# Patient Record
Sex: MALE | Race: OTHER | HISPANIC OR LATINO | Employment: FULL TIME | URBAN - METROPOLITAN AREA
[De-identification: names, ages, dates, MRNs, and addresses within clinical notes are randomized per-mention and may not be internally consistent; named-entity substitution may affect disease eponyms.]

---

## 2019-02-25 ENCOUNTER — HOSPITAL ENCOUNTER (EMERGENCY)
Facility: HOSPITAL | Age: 29
Discharge: HOME | End: 2019-02-25
Attending: EMERGENCY MEDICINE | Admitting: EMERGENCY MEDICINE

## 2019-02-25 VITALS
TEMPERATURE: 98.1 F | HEART RATE: 88 BPM | SYSTOLIC BLOOD PRESSURE: 145 MMHG | DIASTOLIC BLOOD PRESSURE: 90 MMHG | RESPIRATION RATE: 18 BRPM | OXYGEN SATURATION: 96 %

## 2019-02-25 DIAGNOSIS — N48.1 BALANITIS: Primary | ICD-10-CM

## 2019-02-25 DIAGNOSIS — N47.7 POSTHITIS: ICD-10-CM

## 2019-02-25 LAB
BACTERIA UR QL AUTO: ABNORMAL /HPF
BILIRUB UR QL STRIP: NEGATIVE
C TRACH DNA SPEC QL NAA+PROBE: NEGATIVE
CLARITY UR: CLEAR
COLOR UR: YELLOW
GLUCOSE SERPL-MCNC: 93 MG/DL (ref 65–140)
GLUCOSE UR STRIP-MCNC: NEGATIVE MG/DL
HGB UR QL STRIP.AUTO: ABNORMAL
KETONES UR STRIP-MCNC: NEGATIVE MG/DL
LEUKOCYTE ESTERASE UR QL STRIP: ABNORMAL
N GONORRHOEA DNA SPEC QL NAA+PROBE: NEGATIVE
NITRITE UR QL STRIP: NEGATIVE
NON-SQ EPI CELLS URNS QL MICRO: ABNORMAL /HPF
PH UR STRIP.AUTO: 7 [PH] (ref 4.5–8)
PROT UR STRIP-MCNC: ABNORMAL MG/DL
RBC #/AREA URNS AUTO: ABNORMAL /HPF
SP GR UR STRIP.AUTO: >=1.03 (ref 1–1.03)
UROBILINOGEN UR QL STRIP.AUTO: 0.2 E.U./DL
WBC #/AREA URNS AUTO: ABNORMAL /HPF

## 2019-02-25 PROCEDURE — 81003 URINALYSIS AUTO W/O SCOPE: CPT

## 2019-02-25 PROCEDURE — 81001 URINALYSIS AUTO W/SCOPE: CPT

## 2019-02-25 PROCEDURE — 82948 REAGENT STRIP/BLOOD GLUCOSE: CPT

## 2019-02-25 PROCEDURE — 87591 N.GONORRHOEAE DNA AMP PROB: CPT | Performed by: EMERGENCY MEDICINE

## 2019-02-25 PROCEDURE — 99283 EMERGENCY DEPT VISIT LOW MDM: CPT

## 2019-02-25 PROCEDURE — 87491 CHLMYD TRACH DNA AMP PROBE: CPT | Performed by: EMERGENCY MEDICINE

## 2019-02-25 NOTE — ED PROVIDER NOTES
History  Chief Complaint   Patient presents with    Penis / Scrotum Problem     Patient reports penis rash that started in November  Patient states that his penis is now itchy and he has noticed dead skin around it  He also feels pressure on is foreskin some mornings when he wakes up and attempts to urinate  History provided by:  Patient   used: No    Penis / Scrotum Problem   Presenting symptoms: dysuria and penile discharge    Relieved by:  None tried  Worsened by:  Nothing  Ineffective treatments:  None tried  Associated symptoms: genital itching    Associated symptoms: no abdominal pain, no fever, no flank pain, no groin pain, no hematuria, no nausea, no penile redness, no penile swelling, no scrotal swelling, no urinary frequency, no urinary incontinence and no vomiting    Risk factors: unprotected sex    Risk factors: no change in medication, no new sexual partner, no recent infection and no STI exposure        None       History reviewed  No pertinent past medical history  Past Surgical History:   Procedure Laterality Date    PENIS SURGERY         History reviewed  No pertinent family history  I have reviewed and agree with the history as documented  Social History     Tobacco Use    Smoking status: Never Smoker    Smokeless tobacco: Never Used   Substance Use Topics    Alcohol use: Yes     Comment: occasionally    Drug use: Never        Review of Systems   Constitutional: Negative for fever  Gastrointestinal: Negative for abdominal pain, nausea and vomiting  Genitourinary: Positive for discharge and dysuria  Negative for bladder incontinence, flank pain, frequency, hematuria, penile swelling, scrotal swelling and testicular pain  Musculoskeletal: Negative for back pain  Skin: Positive for rash  Allergic/Immunologic: Negative for immunocompromised state  All other systems reviewed and are negative        Physical Exam  Physical Exam   Constitutional: He is oriented to person, place, and time  He appears well-developed and well-nourished  No distress  HENT:   Head: Normocephalic and atraumatic  Eyes: Right conjunctiva is not injected  Left conjunctiva is not injected  Neck: Normal range of motion  Cardiovascular: Normal rate and intact distal pulses  Pulmonary/Chest: Effort normal  No respiratory distress  Abdominal: Soft  He exhibits no distension  There is no tenderness  There is no guarding  Genitourinary: Testes normal  Right testis shows no swelling and no tenderness  Left testis shows no swelling and no tenderness  Uncircumcised  No phimosis or penile tenderness  Discharge found  Lymphadenopathy: No inguinal adenopathy noted on the right or left side  Right: No inguinal adenopathy present  Left: No inguinal adenopathy present  Neurological: He is alert and oriented to person, place, and time  He exhibits normal muscle tone  Skin: Skin is warm and dry  Capillary refill takes less than 2 seconds  No rash noted  He is not diaphoretic  No erythema  No pallor  Psychiatric: He has a normal mood and affect  Nursing note and vitals reviewed        Vital Signs  ED Triage Vitals [02/25/19 0152]   Temperature Pulse Respirations Blood Pressure SpO2   98 1 °F (36 7 °C) 88 18 145/90 96 %      Temp Source Heart Rate Source Patient Position - Orthostatic VS BP Location FiO2 (%)   Oral Monitor Sitting Right arm --      Pain Score       No Pain           Vitals:    02/25/19 0152   BP: 145/90   Pulse: 88   Patient Position - Orthostatic VS: Sitting       Visual Acuity      ED Medications  Medications - No data to display    Diagnostic Studies  Results Reviewed     Procedure Component Value Units Date/Time    Urine Microscopic [[id number]]  (Abnormal) Collected:  02/25/19 0204    Lab Status:  Final result Specimen:  Urine, Clean Catch Updated:  02/25/19 0218     RBC, UA 20-30 /hpf      WBC, UA 4-10 /hpf      Epithelial Cells Occasional /hpf Bacteria, UA Occasional /hpf     Fingerstick Glucose (POCT) [[id number]]  (Normal) Collected:  02/25/19 0215    Lab Status:  Final result Updated:  02/25/19 0216     POC Glucose 93 mg/dl     Chlamydia/GC amplified DNA by PCR [[id number]] Collected:  02/25/19 0204    Lab Status: In process Specimen:  Urine, Other Updated:  02/25/19 0208    POCT urinalysis dipstick [[id number]]  (Abnormal) Resulted:  02/25/19 0206    Lab Status:  Final result Specimen:  Urine Updated:  02/25/19 0206    ED Urine Macroscopic [[id number]]  (Abnormal) Collected:  02/25/19 0204    Lab Status:  Final result Specimen:  Urine Updated:  02/25/19 0203     Color, UA Yellow     Clarity, UA Clear     pH, UA 7 0     Leukocytes, UA Trace     Nitrite, UA Negative     Protein, UA 30 (1+) mg/dl      Glucose, UA Negative mg/dl      Ketones, UA Negative mg/dl      Urobilinogen, UA 0 2 E U /dl      Bilirubin, UA Negative     Blood, UA Large     Specific Gravity, UA >=1 030    Narrative:       CLINITEK RESULT                 No orders to display              Procedures  Procedures       Phone Contacts  ED Phone Contact    ED Course                               MDM  Number of Diagnoses or Management Options  Balanitis: new and requires workup  Posthitis: new and requires workup  Diagnosis management comments: Patient presents for 4 months of penile discharge, itching and now dysuria for the past 3 days  Patient states that this started after he was stuck in [place] in November  He is a  and was stuck on the side of the road  He states that he had to urinate into a juice bottle for several days and was unable to clean his foreskin  Patient states that the itching and discharge have become worse since that time  Patient does not have a family physician to follow up with  Patient has not tried treatment at home  He has had unprotected sex with 1 partner which is his wife  His wife has been tested for STDs and has been negative  Patient now reporting dysuria but no discharge from the urethra  On exam the patient does appear to have a candidal infection from improper foreskin cleaning  There is excoriation markings with bleeding when touched  Does not appear to be STD related  Fingerstick blood sugar is normal   Urinalysis does show red blood cells and white blood cells but only occasional bacteria  Patient is complaining of dysuria because the urine is hitting the area and burning  I do not feel that this is a true urinary tract infection  Will treat with topical antifungals and have him follow up with the [facility]  Urine will be sent for culture and chlamydia and gonorrhea  If those are positive he will receive a call for further treatment  Patient understands this and agrees with this plan of care  Amount and/or Complexity of Data Reviewed  Clinical lab tests: ordered and reviewed  Tests in the medicine section of CPT®: reviewed and ordered    Patient Progress  Patient progress: stable      Disposition  Final diagnoses:   Balanitis   Posthitis     Time reflects when diagnosis was documented in both MDM as applicable and the Disposition within this note     Time User Action Codes Description Comment    2/25/2019  2:19 AM Dev Alegre Add [N48 1] Balanitis     2/25/2019  2:19 AM Corine Rivera Add [N47 7] Posthitis       ED Disposition     ED Disposition Condition Date/Time Comment    Discharge Stable Mon Feb 25, 2019  2:19 AM Bruno Ice Avitia discharge to home/self care              Follow-up Information     Follow up With Specialties Details Why [address] [facility] [address] Family Medicine Call today To schedule an appointment as soon as you can 7 [address] [address] [phone]            Patient's Medications   Discharge Prescriptions    MICONAZOLE 2 % CREAM    Apply topically 2 (two) times a day for 14 days       Start Date: 2/25/2019 End Date: 3/11/2019       Order Dose: --       Quantity: 35 g    Refills: 0     No discharge procedures on file      ED Provider  Electronically Signed by           Briana Puckett DO  02/25/19 8378

## 2019-12-31 ENCOUNTER — HOSPITAL ENCOUNTER (EMERGENCY)
Facility: HOSPITAL | Age: 29
Discharge: HOME | End: 2019-12-31
Attending: EMERGENCY MEDICINE | Admitting: EMERGENCY MEDICINE

## 2019-12-31 VITALS
RESPIRATION RATE: 18 BRPM | SYSTOLIC BLOOD PRESSURE: 140 MMHG | WEIGHT: 221.78 LBS | DIASTOLIC BLOOD PRESSURE: 74 MMHG | HEART RATE: 67 BPM | OXYGEN SATURATION: 98 % | TEMPERATURE: 98.3 F

## 2019-12-31 DIAGNOSIS — N39.0 UTI (URINARY TRACT INFECTION): Primary | ICD-10-CM

## 2019-12-31 LAB
BACTERIA UR QL AUTO: ABNORMAL /HPF
BILIRUB UR QL STRIP: NEGATIVE
CLARITY UR: CLEAR
COLOR UR: YELLOW
GLUCOSE UR STRIP-MCNC: NEGATIVE MG/DL
HGB UR QL STRIP.AUTO: ABNORMAL
KETONES UR STRIP-MCNC: NEGATIVE MG/DL
LEUKOCYTE ESTERASE UR QL STRIP: ABNORMAL
NITRITE UR QL STRIP: NEGATIVE
NON-SQ EPI CELLS URNS QL MICRO: ABNORMAL /HPF
OTHER STN SPEC: ABNORMAL
PH UR STRIP.AUTO: 6 [PH] (ref 4.5–8)
PROT UR STRIP-MCNC: ABNORMAL MG/DL
RBC #/AREA URNS AUTO: ABNORMAL /HPF
SP GR UR STRIP.AUTO: >=1.03 (ref 1–1.03)
UROBILINOGEN UR QL STRIP.AUTO: 1 E.U./DL
WBC #/AREA URNS AUTO: ABNORMAL /HPF

## 2019-12-31 PROCEDURE — 81001 URINALYSIS AUTO W/SCOPE: CPT

## 2019-12-31 PROCEDURE — 87086 URINE CULTURE/COLONY COUNT: CPT

## 2019-12-31 PROCEDURE — 99283 EMERGENCY DEPT VISIT LOW MDM: CPT

## 2019-12-31 PROCEDURE — 87591 N.GONORRHOEAE DNA AMP PROB: CPT | Performed by: EMERGENCY MEDICINE

## 2019-12-31 PROCEDURE — 99283 EMERGENCY DEPT VISIT LOW MDM: CPT | Performed by: EMERGENCY MEDICINE

## 2019-12-31 PROCEDURE — 87491 CHLMYD TRACH DNA AMP PROBE: CPT | Performed by: EMERGENCY MEDICINE

## 2019-12-31 RX ORDER — CEPHALEXIN 250 MG/1
500 CAPSULE ORAL ONCE
Status: COMPLETED | OUTPATIENT
Start: 2019-12-31 | End: 2019-12-31

## 2019-12-31 RX ORDER — CEPHALEXIN 500 MG/1
500 CAPSULE ORAL EVERY 6 HOURS SCHEDULED
Qty: 28 CAPSULE | Refills: 0 | Status: SHIPPED | OUTPATIENT
Start: 2019-12-31 | End: 2020-01-07

## 2019-12-31 RX ADMIN — CEPHALEXIN 500 MG: 250 CAPSULE ORAL at 19:37

## 2019-12-31 NOTE — ED ATTENDING ATTESTATION
12/31/2019  Jax GUTIERREZ, saw and evaluated the patient  I have discussed the patient with the resident/non-physician practitioner and agree with the resident's/non-physician practitioner's findings, Plan of Care, and MDM as documented in the resident's/non-physician practitioner's note, except where noted  All available labs and Radiology studies were reviewed  I was present for key portions of any procedure(s) performed by the resident/non-physician practitioner and I was immediately available to provide assistance  At this point I agree with the current assessment done in the Emergency Department  I have conducted an independent evaluation of this patient a history and physical is as follows:    A 33 yo male with no significant pmhx; presents with dysuria, penile discharge and foreskin swelling  Pt states symptoms have been present for the past several months  Patient describes the discharge as clear to white  Patient reports being seen in the ED for the symptoms at onset, being prescribed a fungal cream which he has been using intermittently without relief  Patient has also noticed a color change to his foreskin, and a tightening to his foreskin  Patient states he is able to retract the foreskin without difficulty  Patient otherwise denies fever, chills, chest pain, shortness of breath, abdominal pain, nausea, vomiting, diarrhea, peripheral edema and rashes  Physical Exam  General Appearance: alert and oriented, nad, non toxic appearing  Skin:  Warm, dry, intact  HEENT: atraumatic, normocephalic  Neck: Supple, trachea midline  Cardiac: RRR; no murmurs, rub, gallops  Pulmonary: lungs CTAB; no wheezes, rales, rhonchi  Gastrointestinal: abdomen soft, nontender, nondistended; no guarding or rebound tenderness; good bowel sounds, no mass or bruits  Genitourinary: Exam completed with resident physician, Dr Brand Said  Foreskin easily retracted, no swelling noted    No discharge noted under the foreskin  No penile discharge  Extremities:  no pedal edema, 2+ pulses; no calf tenderness, no clubbing, no cyanosis  Neuro:  no focal motor or sensory deficits, CN 2-12 grossly intact  Psych:  Normal mood and affect, normal judgement and insight    Assessment and Plan:  1 ) Balanitis, acute on chronic in nature  Will plan to start nystatin cream   Recommend urology follow up  2 ) Dysuria, associated with penile discharge  Pt denies possibility for GC/Chlamydia    Will obtain UA to evaluate for UTI, will also screen for GC/Chlamydia    ED Course         Critical Care Time  Procedures

## 2020-01-01 LAB
BACTERIA UR CULT: NORMAL
C TRACH DNA SPEC QL NAA+PROBE: NEGATIVE
N GONORRHOEA DNA SPEC QL NAA+PROBE: NEGATIVE

## 2020-01-02 NOTE — ED PROVIDER NOTES
History  Chief Complaint   Patient presents with    Penis Pain     Pt  reports pain with urination and white penile discharge  Pt  reports his foreskin is extremely tight  Pt  reports pain with erection  Patient is a 71-year-old male the history of ureteral stenosis status post dilation that presents with penile discharge  Patient says that for the past 12 months he has been having intermittent whitish discharge from his penis  Patient also reports burning dysuria tip of his penis  He also endorses some color change around his foreskin  He also reports that sometimes it is difficult to retract his foreskin was he has an erection he feels like his foreskin is tighter than normal   Patient was seen here about 9 months ago for similar complaints and was diagnosed with balanitis  He was given AT foam will cream which she has been using  He reports no improvement of symptoms  He says that he has not been sexually active for the past 1 month his only been sexually active with his wife over the past several years  No known history of gonorrhea or Chlamydia  He denies any testicular pain, swelling, erythema  He denies any abdominal pain, nausea, vomiting, fevers, chills, rigors  Denies any chest pain or dyspnea  None       History reviewed  No pertinent past medical history  Past Surgical History:   Procedure Laterality Date    PENIS SURGERY         History reviewed  No pertinent family history  I have reviewed and agree with the history as documented  Social History     Tobacco Use    Smoking status: Never Smoker    Smokeless tobacco: Never Used   Substance Use Topics    Alcohol use: Yes     Comment: occasionally    Drug use: Never        Review of Systems   Constitutional: Negative for chills, diaphoresis, fatigue and fever  HENT: Negative for drooling, facial swelling, sore throat and trouble swallowing  Eyes: Negative for photophobia     Respiratory: Negative for cough, choking, chest tightness, shortness of breath, wheezing and stridor  Cardiovascular: Negative for chest pain, palpitations and leg swelling  Gastrointestinal: Negative for abdominal distention, abdominal pain, diarrhea, nausea and vomiting  Genitourinary: Positive for discharge and dysuria  Musculoskeletal: Negative for back pain, neck pain and neck stiffness  Skin: Negative for color change, pallor and rash  Neurological: Negative for dizziness, speech difficulty, weakness, light-headedness, numbness and headaches  Hematological: Negative for adenopathy  Psychiatric/Behavioral: Negative for agitation  All other systems reviewed and are negative  Physical Exam  ED Triage Vitals [12/31/19 1802]   Temperature Pulse Respirations Blood Pressure SpO2   98 3 °F (36 8 °C) 67 18 140/74 98 %      Temp Source Heart Rate Source Patient Position - Orthostatic VS BP Location FiO2 (%)   Oral Monitor Sitting Right arm --      Pain Score       6             Orthostatic Vital Signs  Vitals:    12/31/19 1802   BP: 140/74   Pulse: 67   Patient Position - Orthostatic VS: Sitting       Physical Exam   Constitutional: He is oriented to person, place, and time  He appears well-developed and well-nourished  No distress  HENT:   Head: Normocephalic  Eyes: Pupils are equal, round, and reactive to light  EOM are normal    Neck: Normal range of motion  Neck supple  Cardiovascular: Normal rate, regular rhythm, normal heart sounds and intact distal pulses  Exam reveals no gallop and no friction rub  No murmur heard  Pulmonary/Chest: Effort normal and breath sounds normal    Abdominal: Soft  Bowel sounds are normal  He exhibits no distension  There is no tenderness  There is no guarding  Genitourinary:   Genitourinary Comments: Patient with areas of hypopigmented tissue  Testicles with normal lie, nontender, non erythematous   Musculoskeletal: Normal range of motion     Neurological: He is alert and oriented to person, place, and time  No cranial nerve deficit or sensory deficit  He exhibits normal muscle tone  Skin: Capillary refill takes less than 2 seconds  Psychiatric: He has a normal mood and affect  His behavior is normal  Judgment and thought content normal    Vitals reviewed  ED Medications  Medications   cephalexin (KEFLEX) capsule 500 mg (500 mg Oral Given 12/31/19 1937)       Diagnostic Studies  Results Reviewed     Procedure Component Value Units Date/Time    Chlamydia/GC amplified DNA by PCR [[id number]]  (Normal) Collected:  12/31/19 1852    Lab Status:  Final result Specimen:  Urine, Other Updated:  01/01/20 2322     N gonorrhoeae, DNA Probe Negative     Chlamydia trachomatis, DNA Probe Negative    Narrative:       Test performed using PCR amplification of target DNA  This test is intended as an aid in the diagnosis of Chlamydial and gonococcal disease  This test has not been evaluated in patients younger than 15years of age and is not recommended for evaluation of suspected sexual abuse  Additional testing is recommended when the results do not correlate with clinical signs and symptoms        Urine culture [[id number]] Collected:  12/31/19 1850    Lab Status:  Final result Specimen:  Urine, Clean Catch Updated:  01/01/20 2002     Urine Culture No Growth <1000 cfu/mL    Urine Microscopic [[id number]]  (Abnormal) Collected:  12/31/19 1850    Lab Status:  Final result Specimen:  Urine, Clean Catch Updated:  12/31/19 1926     RBC, UA 0-1 /hpf      WBC, UA 20-30 /hpf      Epithelial Cells Occasional /hpf      Bacteria, UA Occasional /hpf      OTHER OBSERVATIONS WBCs Clumped    POCT urinalysis dipstick [[id number]]  (Abnormal) Resulted:  12/31/19 1856    Lab Status:  Final result Updated:  12/31/19 1856    Urine Macroscopic, POC [[id number]]  (Abnormal) Collected:  12/31/19 1850    Lab Status:  Final result Specimen:  Urine Updated:  12/31/19 1851     Color, UA Yellow     Clarity, UA Clear     pH, UA 6 0 Leukocytes, UA Small     Nitrite, UA Negative     Protein, UA 30 (1+) mg/dl      Glucose, UA Negative mg/dl      Ketones, UA Negative mg/dl      Urobilinogen, UA 1 0 E U /dl      Bilirubin, UA Negative     Blood, UA Trace     Specific Gravity, UA >=1 030    Narrative:       CLINITEK RESULT                 No orders to display         Procedures  Procedures      ED Course                               MDM  Number of Diagnoses or Management Options  UTI (urinary tract infection):   Diagnosis management comments: Patient is a 41-year-old male who presents with urinary complaints suggestive of urinary tract infection  Patient had urinalysis consistent with urinary tract infection and he was treated with Keflex  Patient needs to follow up with Urology for further workup of these complaints as it is unclear what is causing his for skin changes  Disposition  Final diagnoses:   UTI (urinary tract infection)     Time reflects when diagnosis was documented in both MDM as applicable and the Disposition within this note     Time User Action Codes Description Comment    12/31/2019  7:31 PM Yuki Kruse, [address] [N39 0] UTI (urinary tract infection)       ED Disposition     ED Disposition Condition Date/Time Comment    Discharge Stable Tue Dec 31, 2019  7:31 PM [address] discharge to home/self care              Follow-up Information     Follow up With Specialties Details Why [address] [facility] Emergency Department Emergency Medicine  If symptoms worsen [facility] [address] [phone] [place] ED, [address] , [place], [place], [address] [facility] Schedule an appointment as soon as possible for a visit   [address]  [address]  [address]  [facility] [place], [address], [place], [place], [address] [phone]          Discharge Medication List as of 12/31/2019  7:32 PM      START taking these medications    Details   cephalexin (KEFLEX) 500 mg capsule Take 1 capsule (500 mg total) by mouth every 6 (six) hours for 7 days, Starting Tue 12/31/2019, Until Tue 1/7/2020, Print               ED Provider  Attending physically available and evaluated Radha Raymond  MATT managed the patient along with the ED Attending      Electronically Signed by         Zay Heath MD  01/03/20 2046

## 2022-02-27 ENCOUNTER — HOSPITAL ENCOUNTER (EMERGENCY)
Facility: HOSPITAL | Age: 32
Discharge: HOME | End: 2022-02-28
Attending: EMERGENCY MEDICINE
Payer: COMMERCIAL

## 2022-02-27 VITALS
DIASTOLIC BLOOD PRESSURE: 72 MMHG | WEIGHT: 249.34 LBS | HEART RATE: 64 BPM | SYSTOLIC BLOOD PRESSURE: 160 MMHG | TEMPERATURE: 97.6 F | RESPIRATION RATE: 16 BRPM | OXYGEN SATURATION: 98 %

## 2022-02-27 DIAGNOSIS — R30.0 DYSURIA: ICD-10-CM

## 2022-02-27 DIAGNOSIS — N34.2 URETHRITIS: Primary | ICD-10-CM

## 2022-02-27 PROCEDURE — 99283 EMERGENCY DEPT VISIT LOW MDM: CPT

## 2022-02-27 PROCEDURE — 99284 EMERGENCY DEPT VISIT MOD MDM: CPT | Performed by: EMERGENCY MEDICINE

## 2022-02-28 ENCOUNTER — TELEPHONE (OUTPATIENT)
Dept: UROLOGY | Facility: AMBULATORY SURGERY CENTER | Age: 32
End: 2022-02-28

## 2022-02-28 ENCOUNTER — HOSPITAL ENCOUNTER (EMERGENCY)
Facility: HOSPITAL | Age: 32
Discharge: HOME | End: 2022-02-28
Attending: EMERGENCY MEDICINE | Admitting: EMERGENCY MEDICINE
Payer: COMMERCIAL

## 2022-02-28 VITALS
SYSTOLIC BLOOD PRESSURE: 138 MMHG | DIASTOLIC BLOOD PRESSURE: 63 MMHG | WEIGHT: 249.12 LBS | OXYGEN SATURATION: 99 % | HEART RATE: 59 BPM | TEMPERATURE: 98.3 F | RESPIRATION RATE: 20 BRPM

## 2022-02-28 DIAGNOSIS — N39.0 UTI (URINARY TRACT INFECTION): ICD-10-CM

## 2022-02-28 DIAGNOSIS — R33.9 URINARY RETENTION: Primary | ICD-10-CM

## 2022-02-28 DIAGNOSIS — R39.89 URETHRAL PAIN: Primary | ICD-10-CM

## 2022-02-28 LAB
BACTERIA UR QL AUTO: ABNORMAL /HPF
BACTERIA UR QL AUTO: ABNORMAL /HPF
BILIRUB UR QL STRIP: NEGATIVE
BILIRUB UR QL STRIP: NEGATIVE
CLARITY UR: ABNORMAL
CLARITY UR: ABNORMAL
COLOR UR: ABNORMAL
COLOR UR: YELLOW
GLUCOSE UR STRIP-MCNC: NEGATIVE MG/DL
GLUCOSE UR STRIP-MCNC: NEGATIVE MG/DL
HGB UR QL STRIP.AUTO: ABNORMAL
HGB UR QL STRIP.AUTO: ABNORMAL
KETONES UR STRIP-MCNC: NEGATIVE MG/DL
KETONES UR STRIP-MCNC: NEGATIVE MG/DL
LEUKOCYTE ESTERASE UR QL STRIP: ABNORMAL
LEUKOCYTE ESTERASE UR QL STRIP: ABNORMAL
NITRITE UR QL STRIP: NEGATIVE
NITRITE UR QL STRIP: POSITIVE
NON-SQ EPI CELLS URNS QL MICRO: ABNORMAL /HPF
NON-SQ EPI CELLS URNS QL MICRO: ABNORMAL /HPF
PH UR STRIP.AUTO: 6 [PH]
PH UR STRIP.AUTO: 6 [PH] (ref 4.5–8)
PROT UR STRIP-MCNC: ABNORMAL MG/DL
PROT UR STRIP-MCNC: NEGATIVE MG/DL
RBC #/AREA URNS AUTO: ABNORMAL /HPF
RBC #/AREA URNS AUTO: ABNORMAL /HPF
SP GR UR STRIP.AUTO: 1.02 (ref 1–1.03)
SP GR UR STRIP.AUTO: >=1.03 (ref 1–1.03)
UROBILINOGEN UR QL STRIP.AUTO: 0.2 E.U./DL
UROBILINOGEN UR QL STRIP.AUTO: 1 E.U./DL
WBC #/AREA URNS AUTO: ABNORMAL /HPF
WBC #/AREA URNS AUTO: ABNORMAL /HPF

## 2022-02-28 PROCEDURE — 51703 INSERT BLADDER CATH COMPLEX: CPT | Performed by: UROLOGY

## 2022-02-28 PROCEDURE — 87491 CHLMYD TRACH DNA AMP PROBE: CPT | Performed by: EMERGENCY MEDICINE

## 2022-02-28 PROCEDURE — 96365 THER/PROPH/DIAG IV INF INIT: CPT

## 2022-02-28 PROCEDURE — 96375 TX/PRO/DX INJ NEW DRUG ADDON: CPT

## 2022-02-28 PROCEDURE — 99285 EMERGENCY DEPT VISIT HI MDM: CPT | Performed by: EMERGENCY MEDICINE

## 2022-02-28 PROCEDURE — 87591 N.GONORRHOEAE DNA AMP PROB: CPT | Performed by: EMERGENCY MEDICINE

## 2022-02-28 PROCEDURE — 99283 EMERGENCY DEPT VISIT LOW MDM: CPT

## 2022-02-28 PROCEDURE — 81001 URINALYSIS AUTO W/SCOPE: CPT

## 2022-02-28 PROCEDURE — 87086 URINE CULTURE/COLONY COUNT: CPT

## 2022-02-28 PROCEDURE — 96372 THER/PROPH/DIAG INJ SC/IM: CPT

## 2022-02-28 RX ORDER — PHENAZOPYRIDINE HYDROCHLORIDE 100 MG/1
100 TABLET, FILM COATED ORAL ONCE
Status: COMPLETED | OUTPATIENT
Start: 2022-02-28 | End: 2022-02-28

## 2022-02-28 RX ORDER — OXYCODONE HYDROCHLORIDE AND ACETAMINOPHEN 5; 325 MG/1; MG/1
2 TABLET ORAL ONCE
Status: COMPLETED | OUTPATIENT
Start: 2022-02-28 | End: 2022-02-28

## 2022-02-28 RX ORDER — DOXYCYCLINE HYCLATE 100 MG/1
100 CAPSULE ORAL ONCE
Status: COMPLETED | OUTPATIENT
Start: 2022-02-28 | End: 2022-02-28

## 2022-02-28 RX ORDER — LIDOCAINE HYDROCHLORIDE 20 MG/ML
1 JELLY TOPICAL ONCE
Status: COMPLETED | OUTPATIENT
Start: 2022-02-28 | End: 2022-02-28

## 2022-02-28 RX ORDER — IBUPROFEN 600 MG/1
600 TABLET ORAL ONCE
Status: COMPLETED | OUTPATIENT
Start: 2022-02-28 | End: 2022-02-28

## 2022-02-28 RX ORDER — CEPHALEXIN 500 MG/1
500 CAPSULE ORAL EVERY 12 HOURS SCHEDULED
Qty: 20 CAPSULE | Refills: 0 | Status: SHIPPED | OUTPATIENT
Start: 2022-02-28 | End: 2022-03-10

## 2022-02-28 RX ORDER — MORPHINE SULFATE 4 MG/ML
4 INJECTION, SOLUTION INTRAMUSCULAR; INTRAVENOUS ONCE
Status: COMPLETED | OUTPATIENT
Start: 2022-02-28 | End: 2022-02-28

## 2022-02-28 RX ORDER — DOXYCYCLINE HYCLATE 100 MG/1
100 CAPSULE ORAL 2 TIMES DAILY
Qty: 20 CAPSULE | Refills: 0 | Status: SHIPPED | OUTPATIENT
Start: 2022-02-28 | End: 2022-03-10

## 2022-02-28 RX ADMIN — DOXYCYCLINE 100 MG: 100 CAPSULE ORAL at 00:32

## 2022-02-28 RX ADMIN — PHENAZOPYRIDINE HYDROCHLORIDE 100 MG: 100 TABLET ORAL at 00:24

## 2022-02-28 RX ADMIN — MORPHINE SULFATE 4 MG: 4 INJECTION INTRAVENOUS at 12:28

## 2022-02-28 RX ADMIN — LIDOCAINE HYDROCHLORIDE 1 APPLICATION: 20 JELLY TOPICAL at 12:00

## 2022-02-28 RX ADMIN — CEFTRIAXONE 500 MG: 1 INJECTION, POWDER, FOR SOLUTION INTRAMUSCULAR; INTRAVENOUS at 00:32

## 2022-02-28 RX ADMIN — CEFTRIAXONE SODIUM 2000 MG: 10 INJECTION, POWDER, FOR SOLUTION INTRAVENOUS at 12:30

## 2022-02-28 RX ADMIN — OXYCODONE HYDROCHLORIDE AND ACETAMINOPHEN 2 TABLET: 5; 325 TABLET ORAL at 11:32

## 2022-02-28 RX ADMIN — SODIUM CHLORIDE 1000 ML: 0.9 INJECTION, SOLUTION INTRAVENOUS at 12:28

## 2022-02-28 RX ADMIN — LIDOCAINE HYDROCHLORIDE 1 APPLICATION: 20 JELLY TOPICAL at 10:33

## 2022-02-28 RX ADMIN — IBUPROFEN 600 MG: 600 TABLET ORAL at 00:23

## 2022-02-28 NOTE — ED ATTENDING ATTESTATION
2/27/2022  I, Carolina Miller MD, saw and evaluated the patient  I have discussed the patient with the resident/non-physician practitioner and agree with the resident's/non-physician practitioner's findings, Plan of Care, and MDM as documented in the resident's/non-physician practitioner's note, except where noted  All available labs and Radiology studies were reviewed  I was present for key portions of any procedure(s) performed by the resident/non-physician practitioner and I was immediately available to provide assistance  At this point I agree with the current assessment done in the Emergency Department  I have conducted an independent evaluation of this patient a history and physical is as follows:        Final Diagnosis:  1  Urethritis    2  Dysuria      Chief Complaint   Patient presents with    Possible UTI     Pt c/o urinary retention, burning with urinartion and blood in urine for a few days getting worse today Pt also reports noted white discharge to penis in the mornings  This is an otherwise healthy 51-year-old male who presents with dysuria and penile pain  Patient states that for a while now he has been experiencing pain with urination and difficulty urinating  States that he has to strain to fully empty his bladder  Patient has seen a urologist in the past, but this was many years ago  He also admits to pain at the tip of his penis and white discharge only in the morning  Denies any history of STD  He does admit to a small amount of blood at the end of his urine stream   Denies any testicular pain  Denies fever/chills, nausea/vomiting, lightheadedness/dizziness, numbness/weakness, headache, change in vision, URI symptoms, neck pain, chest pain, palpitations, shortness of breath, cough, back pain, flank pain, abdominal pain, diarrhea, hematochezia, melena        PMH:  - not applicable  PSH:  - circumcision and penis surgery    PE:   Vitals:    02/27/22 2315 02/27/22 2318 BP:  160/72   BP Location:  Right arm   Pulse:  64   Resp:  16   Temp: 97 6 °F (36 4 °C)    SpO2:  98%   Weight: 113 kg (249 lb 5 4 oz)            Constitutional: Vital signs are normal  He appears well-developed  He is cooperative  No distress  HENT:   Mouth/Throat: Uvula is midline, oropharynx is clear and moist and mucous membranes are normal    Eyes: Pupils are equal, round, and reactive to light  Conjunctivae and EOM are normal    Neck: Trachea normal  No thyroid mass and no thyromegaly present  Cardiovascular: Normal rate, regular rhythm, normal heart sounds, intact distal pulses and normal pulses  No murmur heard  Pulmonary/Chest: Effort normal and breath sounds normal    Abdominal: Soft  Normal appearance and bowel sounds are normal  There is no tenderness  There is no rebound, no guarding and no CVA tenderness  Neurological: He is alert  Skin: Skin is warm, dry and intact  Psychiatric: He has a normal mood and affect  His speech is normal and behavior is normal  Thought content normal        A:  - this is a 27-year-old male who presents with dysuria and hematuria  P:  - will check urinalysis and GC/chlamydia  Pyridium for symptoms  Patient instructed on the importance of following up with Urology  - 13 point ROS was performed and all are normal unless stated in the history above  - Nursing note reviewed  Vitals reviewed  - Orders placed by myself and/or advanced practitioner / resident     - Previous chart was reviewed  - No language barrier    - History obtained from patient  - There are no limitations to the history obtained  - Critical care time: Not applicable for this patient            Medications   cefTRIAXone (ROCEPHIN) 500 mg in lidocaine (PF) (XYLOCAINE-MPF) 1 % IM only syringe (has no administration in time range)   ibuprofen (MOTRIN) tablet 600 mg (600 mg Oral Given 2/28/22 0023)   phenazopyridine (PYRIDIUM) tablet 100 mg (100 mg Oral Given 2/28/22 0024) doxycycline hyclate (VIBRAMYCIN) capsule 100 mg (100 mg Oral Given 2/28/22 0032)     No orders to display     Orders Placed This Encounter   Procedures    Chlamydia/GC amplified DNA by PCR    Urine Microscopic    Urine dip analyzer     Labs Reviewed   URINE MACROSCOPIC, POC - Abnormal       Result Value Ref Range Status    Color, UA Yellow   Final    Clarity, UA Cloudy   Final    pH, UA 6 0  4 5 - 8 0 Final    Leukocytes, UA Small (*) Negative Final    Nitrite, UA Negative  Negative Final    Protein, UA Negative  Negative mg/dl Final    Glucose, UA Negative  Negative mg/dl Final    Ketones, UA Negative  Negative mg/dl Final    Urobilinogen, UA 0 2  0 2, 1 0 E U /dl E U /dl Final    Bilirubin, UA Negative  Negative Final    Blood, UA Moderate (*) Negative Final    Specific Gravity, UA >=1 030  1 003 - 1 030 Final    Narrative:     CLINITEK RESULT   CHLAMYDIA /GC AMPLIFIED DNA   URINE MICROSCOPIC     Time reflects when diagnosis was documented in both MDM as applicable and the Disposition within this note     Time User Action Codes Description Comment    2/28/2022 12:26 AM Shital Brandt Add [N34 2] Urethritis     2/28/2022 12:26 AM Shital Brandt Add [R30 0] Dysuria       ED Disposition     ED Disposition Condition Date/Time Comment    Discharge Stable Mon Feb 28, 2022 12:24 AM Denise Avitia discharge to home/self care  Follow-up Information     Follow up With Specialties Details Why Contact Info Additional Information    Alessandra Peralta MD Urology Schedule an appointment as soon as possible for a visit  To make appointment for reevaluation in 1-3 days  [address] [facility]  [address] [facility] [place] [facility] Schedule an appointment as soon as possible for a visit  To make appointment for reevaluation in 3-5 days   [place] [id number]  [address]  [facility] [place], [address], [place], [place], [address] [phone]        Patient's Medications   Discharge Prescriptions    DOXYCYCLINE HYCLATE (VIBRAMYCIN) 100 MG CAPSULE    Take 1 capsule (100 mg total) by mouth 2 (two) times a day for 10 days       Start Date: 2/28/2022 End Date: 3/10/2022       Order Dose: 100 mg       Quantity: 20 capsule    Refills: 0     No discharge procedures on file  None       Portions of the record may have been created with voice recognition software  Occasional wrong word or "sound a like" substitutions may have occurred due to the inherent limitations of voice recognition software  Read the chart carefully and recognize, using context, where substitutions have occurred        ED Course         Critical Care Time  Procedures

## 2022-02-28 NOTE — ED ATTENDING ATTESTATION
2/28/2022  Jax GUTIERREZ, saw and evaluated the patient  I have discussed the patient with the resident/non-physician practitioner and agree with the resident's/non-physician practitioner's findings, Plan of Care, and MDM as documented in the resident's/non-physician practitioner's note, except where noted  All available labs and Radiology studies were reviewed  I was present for key portions of any procedure(s) performed by the resident/non-physician practitioner and I was immediately available to provide assistance  At this point I agree with the current assessment done in the Emergency Department  I have conducted an independent evaluation of this patient a history and physical is as follows:      A 31 yo male with pmhx of meatal stenosis s/p urethral meatoplasty (at [facility], in 2016); presents with urinary retention  Patient reports progressively worsening dysuria, hematuria and urinary dribbling  Patient was seen last evening in the ED for the symptoms, treated for suspected urethritis with intramuscular Rocephin and oral doxycycline  Patient states he has been unable to pass any urine since returning home last evening  Patient now with increasing lower abdominal pain  Patient otherwise denies fever, chills, chest pain, shortness of breath, nausea, vomiting, diarrhea, peripheral edema and rashes      Physical Exam  General Appearance: alert and oriented, appears uncomfortable  Skin:  Warm, dry, intact  HEENT: atraumatic, normocephalic  Neck: Supple, trachea midline  Cardiac: RRR; no murmurs, rub, gallops  Pulmonary: lungs CTAB; no wheezes, rales, rhonchi  Gastrointestinal: abdomen soft, suprapubic tenderness with palpable distended bladder, nondistended; no guarding or rebound tenderness; good bowel sounds, no mass or bruits  Extremities:  no pedal edema, 2+ pulses; no calf tenderness, no clubbing, no cyanosis  Neuro:  no focal motor or sensory deficits, CN 2-12 grossly intact  Psych:  Normal mood and affect, normal judgement and insight    Assessment and Plan:  Urinary retention, bladder scan with 560 cc of urine  Patient able to void approximately 5 cc for urine sample, will send for UA and culture  Will have RN place Cee catheter and discuss with urology  ED Course  ED Course as of 02/28/22 1607   Mon Feb 28, 2022   1223 Urology AP at bedside attempting to dilate pt's urethra for cee catheter  Pt very uncomfortable regardless of percocet and urojet    Will place IV line and give morphine         Critical Care Time  Procedures

## 2022-02-28 NOTE — ED NOTES
This RN and RN Ced Melendez at bedside attempting to catheterize patient  RN using 16Fr coude catheter  This RN unable to advance catheter past meatus  Patient complaining of significant pain during attempt  This RN updated ED resident Dr Makayla Fowler and ED attending Dr Zeynep Garcia RN  02/28/22 1683

## 2022-02-28 NOTE — TELEPHONE ENCOUNTER
Patient had Rivas catheter placed in ER due to urethral stricture  He will require outpatient follow-up in the next 1 to 2 weeks with cystoscopy

## 2022-02-28 NOTE — DISCHARGE INSTRUCTIONS
Thank you for coming to the ER today  Please follow up with your primary care doctor in 1-2 days to be re-evaluated  If at any point you experience any new or worsening symptoms do not hesitate to come back to the hospital to be evaluated  Please continue the antibiotic as prescribed on the bottle  Please schedule an appointment with urology for further workup  Thank you and hope you have a great rest of your day

## 2022-02-28 NOTE — TELEPHONE ENCOUNTER
Please Triage -   New Patient- [facility]     What is the reason for the patients appointment? Patient called stating he was in ER with urinary retention   Patient has cee catheter and needs appointment for removal         Imaging/Lab Results:      Do we accept the patient's insurance or is the patient Self-Pay? Provider & Plan:   Member ID#: Has the patient had any previous urologist(s)? Have patient records been requested? Has the patient had any outside testing done? Does the patient have a personal history of cancer?       Patient can be reached at :

## 2022-02-28 NOTE — Clinical Note
Rashid Majano was seen and treated in our emergency department on 2/28/2022  Diagnosis:     Sangita Chun  is off the rest of the shift today  He may return on this date: 03/01/2022         If you have any questions or concerns, please don't hesitate to call        Marita Favre, DO    ______________________________           _______________          _______________  Hospital Representative                              Date                                Time

## 2022-02-28 NOTE — CONSULTS
32year old man with meatal stricture treated with meatotomy in 2016 by [address] [facility] urology and later circucmcision for phimosis in 2019  Did well for few years  He never dilated himself  Occasionally has flow changes  However unable to urinate since yesterday  Came in with urinary retention very uncomfortable  Bladder scan near 600ml  Exam meatal stricture about 1cm into the glans  Glans prepped with betadine  Urojet lubricant placed per urethra with manual pressure to attempt dilation  This was very uncomfortable for him  Next a 5Fr open ended catheter was placed through the stricture into the bladder  This was confirmed with copious urine output 250cc providing him some relief  Next a guide wire was placed through catheter and cathter removed  Next sequential Lorenza dilators from 10Fr up to 18Fr were used to dilate the stricture  He tolerated this with quite some difficulty despite percocet and morphine  With his permission to continue up to 18Fr next a 16Fr Venetie IRA tip catheter was placed over the guide wire and advanced past the meatal stricture  Unfortunately resistance was again encountered near the bulbar urethra  Due to patient and provider fatigue the remainder of the procedure was aborted until assistance of Dr Sarah Escalera arrives to complete the procedure or alternative procedure   Patient agrees and is now resting awaiting MD arrival     Yo Disla PA-C  02/28/22  1:43 PM

## 2022-02-28 NOTE — TELEPHONE ENCOUNTER
Called and spoke with patient  I explained to patient he needs to keep the cee catheter for 1-2 weeks per Dr Prashant Concepcion   Patient scheduled 03/08/2022 at 21 [phone] for cystoscopy with Dr Debbie Raya  Patient made aware of the procedure for 3/8/2022

## 2022-02-28 NOTE — ED PROVIDER NOTES
History  Chief Complaint   Patient presents with    Possible UTI     Pt c/o urinary retention, burning with urinartion and blood in urine for a few days getting worse today Pt also reports noted white discharge to penis in the mornings  Sera Mar is a 32y o  year old male presenting to the [facility] ED for dysuria and urethral discharge  Patient reports months of discomfort with urination, intermittent difficulty urinating and urethral discharge  History notable for recent circumcision and underwent urethral meatoplasty with urology previously  The patient noticed some blood and white, thin discharge from his urethra moreso lately  Worse in the morning  Patient intermittently has the urge to urinate though has difficulty voiding  Patient denies genital lesions  Reports that he is in a monogamous relationship with his spouse  Patient denies fevers, N/V/D, abdominal discomfort or flank pain  Patient has not taken/received any medications at home for relief of symptoms  History provided by:  Patient   used: No        None       History reviewed  No pertinent past medical history  Past Surgical History:   Procedure Laterality Date    PENIS SURGERY         History reviewed  No pertinent family history  I have reviewed and agree with the history as documented  E-Cigarette/Vaping     E-Cigarette/Vaping Substances     Social History     Tobacco Use    Smoking status: Light Tobacco Smoker    Smokeless tobacco: Never Used    Tobacco comment: "hookah"   Substance Use Topics    Alcohol use: Yes     Comment: occasionally    Drug use: Never        Review of Systems   Constitutional: Negative for chills, fatigue and fever  HENT: Negative for congestion and rhinorrhea  Respiratory: Negative for cough, chest tightness and shortness of breath  Cardiovascular: Negative for chest pain  Gastrointestinal: Negative for abdominal pain, diarrhea, nausea and vomiting     Endocrine: Negative for polyuria  Genitourinary: Positive for difficulty urinating, dysuria, hematuria, penile discharge and penile pain  Negative for flank pain, genital sores, penile swelling, scrotal swelling and testicular pain  Musculoskeletal: Negative for myalgias  Skin: Negative for rash  Neurological: Negative for weakness and headaches  Hematological: Does not bruise/bleed easily  Psychiatric/Behavioral: Negative for behavioral problems and confusion  All other systems reviewed and are negative  Physical Exam  ED Triage Vitals   Temperature Pulse Respirations Blood Pressure SpO2   02/27/22 2315 02/27/22 2318 02/27/22 2318 02/27/22 2318 02/27/22 2318   97 6 °F (36 4 °C) 64 16 160/72 98 %      Temp src Heart Rate Source Patient Position - Orthostatic VS BP Location FiO2 (%)   -- 02/27/22 2318 02/27/22 2318 02/27/22 2318 --    Monitor Sitting Right arm       Pain Score       02/27/22 2318       10 - Worst Possible Pain             Orthostatic Vital Signs  Vitals:    02/27/22 2318   BP: 160/72   Pulse: 64   Patient Position - Orthostatic VS: Sitting       Physical Exam  Vitals and nursing note reviewed  Constitutional:       General: He is not in acute distress  Appearance: Normal appearance  He is well-developed  He is not ill-appearing, toxic-appearing or diaphoretic  HENT:      Head: Normocephalic and atraumatic  Nose: No congestion or rhinorrhea  Eyes:      General:         Right eye: No discharge  Left eye: No discharge  Cardiovascular:      Rate and Rhythm: Normal rate and regular rhythm  Heart sounds: No murmur heard  Pulmonary:      Effort: Pulmonary effort is normal  No respiratory distress  Breath sounds: Normal breath sounds  No wheezing or rales  Abdominal:      General: There is no distension  Palpations: Abdomen is soft  Tenderness: There is no abdominal tenderness   There is no right CVA tenderness, left CVA tenderness, guarding or rebound  Genitourinary:     Pubic Area: No rash  Penis: Circumcised  Tenderness (glans) and discharge (thin white) present  No phimosis, paraphimosis, erythema or lesions  Testes: Normal          Right: Tenderness or swelling not present  Left: Tenderness or swelling not present  Epididymis:      Right: Not inflamed or enlarged  No tenderness  Left: Not inflamed or enlarged  No tenderness  Musculoskeletal:      Cervical back: Normal range of motion  No rigidity  Lymphadenopathy:      Lower Body: No right inguinal adenopathy  No left inguinal adenopathy  Skin:     Capillary Refill: Capillary refill takes less than 2 seconds  Findings: No rash  Neurological:      Mental Status: He is alert and oriented to person, place, and time  Psychiatric:         Mood and Affect: Mood normal          Behavior: Behavior normal          ED Medications  Medications   ibuprofen (MOTRIN) tablet 600 mg (600 mg Oral Given 2/28/22 0023)   phenazopyridine (PYRIDIUM) tablet 100 mg (100 mg Oral Given 2/28/22 0024)   cefTRIAXone (ROCEPHIN) 500 mg in lidocaine (PF) (XYLOCAINE-MPF) 1 % IM only syringe (500 mg Intramuscular Given 2/28/22 0032)   doxycycline hyclate (VIBRAMYCIN) capsule 100 mg (100 mg Oral Given 2/28/22 0032)       Diagnostic Studies  Results Reviewed     Procedure Component Value Units Date/Time    Urine Microscopic [[id number]]  (Abnormal) Collected: 02/28/22 0013    Lab Status: Final result Specimen: Urine, Clean Catch Updated: 02/28/22 0123     RBC, UA 10-20 /hpf      WBC, UA 1-2 /hpf      Epithelial Cells Occasional /hpf      Bacteria, UA Occasional /hpf     Chlamydia/GC amplified DNA by PCR [[id number]] Collected: 02/28/22 0017    Lab Status:  In process Specimen: Urine, Other Updated: 02/28/22 0035    Urine Macroscopic, POC [[id number]]  (Abnormal) Collected: 02/28/22 0013    Lab Status: Final result Specimen: Urine Updated: 02/28/22 0014     Color, UA Yellow     Clarity, UA Cloudy     pH, UA 6 0     Leukocytes, UA Small     Nitrite, UA Negative     Protein, UA Negative mg/dl      Glucose, UA Negative mg/dl      Ketones, UA Negative mg/dl      Urobilinogen, UA 0 2 E U /dl      Bilirubin, UA Negative     Blood, UA Moderate     Specific Gravity, UA >=1 030    Narrative:      CLINITEK RESULT                 No orders to display         Procedures  Procedures      ED Course  ED Course as of 02/28/22 0321   Mon Feb 28, 2022   0015 Leukocytes, UA(!): Small   0015 Blood, UA(!): Moderate   0022 Glucose, UA: Negative                                       MDM  Number of Diagnoses or Management Options  Dysuria  Urethritis  Diagnosis management comments:   32 y o  male presenting for dysuria and urethral discharge  Thin white urethral discharge expressed from urethra  No testicular or scrotal tenderness/erythema  Will order UA, G/C and treat with course of antibiotics  Patient given motrin and pyridium in ED for symptomatic relief  D/t history of meatoplasty, strongly encouraged to be evaluated by urology  I have discussed with the patient our plan to discharge them from the ED and the patient is in agreement with this plan  The patient was provided a written after visit summary with strict RTED precautions  Discharge Plan: Rx for antibiotics  Followup: I have discussed with the patient plan to follow up with Urology   Contact information provided in AVS        Amount and/or Complexity of Data Reviewed  Clinical lab tests: ordered and reviewed  Review and summarize past medical records: yes    Patient Progress  Patient progress: stable      Disposition  Final diagnoses:   Urethritis   Dysuria     Time reflects when diagnosis was documented in both MDM as applicable and the Disposition within this note     Time User Action Codes Description Comment    2/28/2022 12:26 AM Kevin Casiano Add [N34 2] Urethritis     2/28/2022 12:26 AM Kevin Casiano Add [R30 0] Dysuria       ED Disposition     ED Disposition Condition Date/Time Comment    Discharge Stable Mon Feb 28, 2022 12:24 AM Mazin Mejía discharge to home/self care  Follow-up Information     Follow up With Specialties Details Why Contact Info Additional Information    Rocio Vega MD Urology Schedule an appointment as soon as possible for a visit  To make appointment for reevaluation in 1-3 days  [address]  [facility]  [address] [facility] [place] [facility] Schedule an appointment as soon as possible for a visit  To make appointment for reevaluation in 3-5 days  [facility] [phone]  [address]  [facility] [place], [place], [id number] [phone]          Discharge Medication List as of 2/28/2022 12:29 AM      START taking these medications    Details   doxycycline hyclate (VIBRAMYCIN) 100 mg capsule Take 1 capsule (100 mg total) by mouth 2 (two) times a day for 10 days, Starting Mon 2/28/2022, Until Thu 3/10/2022, Normal           No discharge procedures on file  PDMP Review     None           ED Provider  Attending physically available and evaluated Mazin Mejía I managed the patient along with the ED Attending      Electronically Signed by         Whit Griffith DO  02/28/22 7061

## 2022-02-28 NOTE — DISCHARGE INSTRUCTIONS
You have been seen for urethral discharge  Please complete the full course of doxycycline as prescribed  Return to the emergency department if you develop worsening discomfort, fevers, abdominal pain or any other symptoms of concern  Please follow up with urology by calling the number provided

## 2022-02-28 NOTE — ED PROVIDER NOTES
History  Chief Complaint   Patient presents with    Urinary Frequency     pt reports that he was seen yesterday for same symptoms; patirnt reports that he is unable to urinary; patient's last void was last night; patient reports that he started to go this morning and only drops of blood came out     Patient is a 27-year-old male who presents to the emergency department chief complaint of I am unable to pee  Patient states he was seen yesterday for similar symptoms however he was still able to go the bathroom a bit  Patient states he has been experiencing dysuria, hematuria for a while  Patient states that this morning he woke up and was unable to go to the bathroom and off  Patient states he seen a urologist before in the past but can not remember the last time he did  History is notable for recent circumcision underwent urethral medial plasty with Urology in the past   Patient states that for the past couple of weeks he has had intermittent urethral discharge  He denies any previous history of STDs  Patient denies any fevers, chills, chest pain, shortness breath, nausea, vomiting, diarrhea, constipation, flank pain, numbness or tingling in any of his extremities, headache, vision change, hematochezia, melena, testicular pain, testicular rashes  Prior to Admission Medications   Prescriptions Last Dose Informant Patient Reported? Taking?   doxycycline hyclate (VIBRAMYCIN) 100 mg capsule   No No   Sig: Take 1 capsule (100 mg total) by mouth 2 (two) times a day for 10 days      Facility-Administered Medications: None       History reviewed  No pertinent past medical history  Past Surgical History:   Procedure Laterality Date    PENIS SURGERY         History reviewed  No pertinent family history  I have reviewed and agree with the history as documented      E-Cigarette/Vaping     E-Cigarette/Vaping Substances     Social History     Tobacco Use    Smoking status: Light Tobacco Smoker    Smokeless tobacco: Never Used    Tobacco comment: "hookah"   Substance Use Topics    Alcohol use: Yes     Comment: occasionally    Drug use: Never        Review of Systems   Constitutional: Positive for activity change  Negative for chills, fatigue and fever  HENT: Negative for congestion, ear pain and sore throat  Eyes: Negative for pain and visual disturbance  Respiratory: Negative for cough, chest tightness and shortness of breath  Cardiovascular: Negative for chest pain and palpitations  Gastrointestinal: Negative for abdominal pain, constipation, diarrhea, nausea and vomiting  Genitourinary: Positive for difficulty urinating, dysuria, hematuria and penile discharge  Negative for flank pain, penile pain, penile swelling, scrotal swelling and testicular pain  Musculoskeletal: Negative for arthralgias and back pain  Skin: Negative for color change and rash  Neurological: Negative for dizziness, seizures, syncope, facial asymmetry, weakness, light-headedness, numbness and headaches  Psychiatric/Behavioral: Negative for agitation and behavioral problems  All other systems reviewed and are negative  Physical Exam  ED Triage Vitals   Temperature Pulse Respirations Blood Pressure SpO2   02/28/22 0949 02/28/22 0949 02/28/22 0949 02/28/22 0950 02/28/22 0949   98 3 °F (36 8 °C) 59 20 (!) 162/139 99 %      Temp Source Heart Rate Source Patient Position - Orthostatic VS BP Location FiO2 (%)   02/28/22 0949 -- 02/28/22 0958 02/28/22 0958 --   Oral  Sitting Right arm       Pain Score       02/28/22 0958       10 - Worst Possible Pain             Orthostatic Vital Signs  Vitals:    02/28/22 0949 02/28/22 0950 02/28/22 0958   BP:  (!) 162/139 138/63   Pulse: 59     Patient Position - Orthostatic VS:   Sitting       Physical Exam  Vitals and nursing note reviewed  Exam conducted with a chaperone present  Constitutional:       Appearance: Normal appearance  He is well-developed     HENT: Head: Normocephalic and atraumatic  Right Ear: External ear normal       Left Ear: External ear normal       Nose: Nose normal       Mouth/Throat:      Mouth: Mucous membranes are moist    Eyes:      Extraocular Movements: Extraocular movements intact  Conjunctiva/sclera: Conjunctivae normal       Pupils: Pupils are equal, round, and reactive to light  Cardiovascular:      Rate and Rhythm: Normal rate and regular rhythm  Heart sounds: Normal heart sounds  No murmur heard  Pulmonary:      Effort: Pulmonary effort is normal  No respiratory distress  Breath sounds: Normal breath sounds  No wheezing  Abdominal:      General: There is distension  Palpations: Abdomen is soft  Tenderness: There is no abdominal tenderness  There is no guarding or rebound  Genitourinary:     Pubic Area: No rash  Penis: Normal and circumcised  No discharge  Testes: Normal  Cremasteric reflex is present  Right: Tenderness not present  Left: Tenderness not present  Musculoskeletal:         General: Normal range of motion  Cervical back: Normal range of motion and neck supple  Right lower leg: No edema  Left lower leg: No edema  Skin:     General: Skin is warm and dry  Capillary Refill: Capillary refill takes less than 2 seconds  Neurological:      General: No focal deficit present  Mental Status: He is alert and oriented to person, place, and time     Psychiatric:         Mood and Affect: Mood normal          Behavior: Behavior normal          ED Medications  Medications   lidocaine (URO-JET) 2 % urethral/mucosal gel 1 application (1 application Urethral Given 2/28/22 1033)   ceftriaxone (ROCEPHIN) 2 g/50 mL in dextrose IVPB (0 mg Intravenous Stopped 2/28/22 1300)   lidocaine (URO-JET) 2 % urethral/mucosal gel 1 application (1 application Urethral Given by Other 2/28/22 1200)   oxyCODONE-acetaminophen (PERCOCET) 5-325 mg per tablet 2 tablet (2 tablets Oral Given 2/28/22 1132)   morphine (PF) 4 mg/mL injection 4 mg (4 mg Intravenous Given 2/28/22 1228)   sodium chloride 0 9 % bolus 1,000 mL (0 mL Intravenous Stopped 2/28/22 1328)       Diagnostic Studies  Results Reviewed     Procedure Component Value Units Date/Time    Urine Microscopic [[id number]]  (Abnormal) Collected: 02/28/22 1027    Lab Status: Final result Specimen: Urine, Clean Catch Updated: 02/28/22 1116     RBC, UA 30-50 /hpf      WBC, UA 20-30 /hpf      Epithelial Cells Occasional /hpf      Bacteria, UA Moderate /hpf     Urine culture [[id number]] Collected: 02/28/22 1027    Lab Status: In process Specimen: Urine, Clean Catch Updated: 02/28/22 1116    UA w Reflex to Microscopic w Reflex to Culture [[id number]]  (Abnormal) Collected: 02/28/22 1027    Lab Status: Final result Specimen: Urine, Clean Catch Updated: 02/28/22 1101     Color, UA Orange     Clarity, UA Slightly Cloudy     Specific Gravity, UA 1 025     pH, UA 6 0     Leukocytes, UA Small     Nitrite, UA Positive     Protein, UA Trace mg/dl      Glucose, UA Negative mg/dl      Ketones, UA Negative mg/dl      Urobilinogen, UA 1 0 E U /dl      Bilirubin, UA Negative     Blood, UA Moderate                 No orders to display         Procedures  Procedures      ED Course  ED Course as of 02/28/22 1441   Mon Feb 28, 2022   1017 Blood Pressure: 138/63   1017 Temperature: 98 3 °F (36 8 °C)   1017 Temp Source: Oral   1017 Pulse: 59   1017 Respirations: 20   1017 SpO2: 99 %   1058 Unable to be advanced Rivas  Reach out to urology for assistance and for evaluation of patient  1102 Nitrite, UA(!): Positive   1102 Blood, UA(!): Moderate   1107 Urology will come and evaluate patient  Will treat patient with 2 of rocephen    1121 Bacteria, UA(!): Moderate   1258 Urology still in room unable to pass catheter  They are going to keep attempting  [id number] Patient re-evaluated still waiting for urology to come in place the Rivas catheter     1420 Catheter placed  Prescribed patient Keflex for 10 days advised him to follow-up with urology for further workup  Patient is where of all findings  Advised him to follow-up with his primary care doctor in a few days for re-evaluation  Advised him to come back to the hospital with any new, worsening or concerning symptoms  Patient and his wife have no questions or concerns at this time is stable for discharge  SBIRT 20yo+      Most Recent Value   SBIRT (24 yo +)    In order to provide better care to our patients, we are screening all of our patients for alcohol and drug use  Would it be okay to ask you these screening questions? No Filed at: 02/28/2022 1022                MDM    Disposition  Final diagnoses:   Urinary retention   UTI (urinary tract infection)     Time reflects when diagnosis was documented in both MDM as applicable and the Disposition within this note     Time User Action Codes Description Comment    2/28/2022 10:41 AM Doug Loose Add [R33 9] Urinary retention     2/28/2022  2:17 PM Doug Loose Add [N39 0] UTI (urinary tract infection)       ED Disposition     ED Disposition Condition Date/Time Comment    Discharge Stable Mon Feb 28, 2022  2:17 PM Para Lyle Avitia discharge to home/self care              Follow-up Information     Follow up With Specialties Details Why Contact Info Additional [address] [facility] Emergency Department Emergency Medicine Go to  As needed, If symptoms worsen [place] [phone]  [address] [facility] Emergency Department, [address] [facility] , [facility] For Urology [facility] Urology Schedule an appointment as soon as possible for a visit  for follow up [phone] [phone]  [address] [facility] For Urology [facility], [address], [address] [facility],  [facility] Discharge Medication List as of 2/28/2022  2:20 PM      START taking these medications    Details   cephalexin (KEFLEX) 500 mg capsule Take 1 capsule (500 mg total) by mouth every 12 (twelve) hours for 10 days, Starting Mon 2/28/2022, Until Thu 3/10/2022, Normal         CONTINUE these medications which have NOT CHANGED    Details   doxycycline hyclate (VIBRAMYCIN) 100 mg capsule Take 1 capsule (100 mg total) by mouth 2 (two) times a day for 10 days, Starting Mon 2/28/2022, Until Thu 3/10/2022, Normal           No discharge procedures on file  PDMP Review     None           ED Provider  Attending physically available and evaluated Kellydanica Zamora  MATT managed the patient along with the ED Attending      Electronically Signed by         Lizzeth Gregory DO  02/28/22 9234

## 2022-03-03 RX ORDER — OXYBUTYNIN CHLORIDE 5 MG/1
5 TABLET ORAL 3 TIMES DAILY PRN
Qty: 30 TABLET | Refills: 0 | Status: SHIPPED | OUTPATIENT
Start: 2022-03-03 | End: 2022-03-10

## 2022-03-03 RX ORDER — PHENAZOPYRIDINE HYDROCHLORIDE 200 MG/1
200 TABLET, FILM COATED ORAL 3 TIMES DAILY PRN
Qty: 20 TABLET | Refills: 0 | Status: SHIPPED | OUTPATIENT
Start: 2022-03-03 | End: 2022-04-27 | Stop reason: HOSPADM

## 2022-03-03 NOTE — TELEPHONE ENCOUNTER
Patient called to see if there was anything that can be done he is in pain and has discomfort  His procedure is schedule for 03/08/2022  He wants to know if anything can be give Rx for relief  Please call patient back

## 2022-03-03 NOTE — TELEPHONE ENCOUNTER
Returned call to patient and made him aware of Stephani Pacheco PA-C's recommendations and prescriptions for Oxybutynin and Pyridium  Patient verbalized understanding

## 2022-03-03 NOTE — TELEPHONE ENCOUNTER
Called and spoke with patient  States he is having some irritation on the inside of his penis where catheter is inserted and he feels it rubbing  Patient also states he feels like he is having spasms  States when he lays down in bed at night the discomfort gets worse because he gets an erection and then it causes spasms  Patient states he has taken Tylenol a few times to help with the discomfort, but it doesn't help too much  Patient is a new patient to our office, however was seen as a consult by Oswaldo Aguirre in the hospital  He is scheduled for cysto with Dr Efrain Asencio on 3/8 for stricture  He is requesting medication be ordered for spasms/discomfort

## 2022-03-03 NOTE — TELEPHONE ENCOUNTER
rx for ditropan and pyridium sent to his pharmacy  Try the pyridium first  Safe to take with the tylenol can also take ibuprofen or naproxen  Keep cysto appt 3/8 for meatal stricture, cee stays until then thanks!

## 2022-03-05 ENCOUNTER — HOSPITAL ENCOUNTER (EMERGENCY)
Facility: HOSPITAL | Age: 32
Discharge: HOME | End: 2022-03-05
Attending: EMERGENCY MEDICINE
Payer: COMMERCIAL

## 2022-03-05 VITALS
TEMPERATURE: 97.7 F | HEART RATE: 59 BPM | RESPIRATION RATE: 20 BRPM | OXYGEN SATURATION: 98 % | SYSTOLIC BLOOD PRESSURE: 141 MMHG | DIASTOLIC BLOOD PRESSURE: 86 MMHG

## 2022-03-05 DIAGNOSIS — N48.89 PENILE PAIN: Primary | ICD-10-CM

## 2022-03-05 PROCEDURE — 99283 EMERGENCY DEPT VISIT LOW MDM: CPT

## 2022-03-05 PROCEDURE — 99284 EMERGENCY DEPT VISIT MOD MDM: CPT | Performed by: EMERGENCY MEDICINE

## 2022-03-05 RX ORDER — OXYCODONE HYDROCHLORIDE 5 MG/1
5 TABLET ORAL ONCE AS NEEDED
Qty: 5 TABLET | Refills: 0 | Status: SHIPPED | OUTPATIENT
Start: 2022-03-05 | End: 2022-04-27 | Stop reason: HOSPADM

## 2022-03-05 RX ORDER — LIDOCAINE HYDROCHLORIDE 20 MG/ML
JELLY TOPICAL AS NEEDED
Qty: 30 ML | Refills: 0 | Status: SHIPPED | OUTPATIENT
Start: 2022-03-05

## 2022-03-05 NOTE — ED PROVIDER NOTES
History  Chief Complaint   Patient presents with    Urinary Catheter Problem     pt  reports catheter pain "burning at the tip" pt  reprots I cant sleep  pt  reports appointment with urology on tuesday  catheter was placed in ed for obstruction       History provided by:  Patient   used: No    Urinary Catheter Problem  Location:  Penile urethra  Quality:  Pain  Severity:  Moderate  Onset quality:  Gradual  Duration:  5 days  Timing:  Intermittent  Progression:  Waxing and waning  Chronicity:  New  Context:  Complicated hx of Urethral stricture, prior Meatotomy and Cricumcision at [facility], came on 2/28 with retention, required Urologist (Dr Leda Ashley) to put Cath in  Has been having penile pain and discomfort, on Keflex, Oxybutynin and Pyridium by Urology  Urine draining in bag  Scheduled for Cysto on 3/8  Relieved by:  Nothing  Worsened by:  Nothing  Ineffective treatments:  None  Associated symptoms: no abdominal pain, no chest pain, no cough, no diarrhea, no fever, no headaches, no loss of consciousness, no nausea, no rash, no shortness of breath, no sore throat and no vomiting    Risk factors:  Urethral stricture      Prior to Admission Medications   Prescriptions Last Dose Informant Patient Reported? Taking? cephalexin (KEFLEX) 500 mg capsule   No No   Sig: Take 1 capsule (500 mg total) by mouth every 12 (twelve) hours for 10 days   doxycycline hyclate (VIBRAMYCIN) 100 mg capsule   No No   Sig: Take 1 capsule (100 mg total) by mouth 2 (two) times a day for 10 days   oxybutynin (DITROPAN) 5 mg tablet   No No   Sig: Take 1 tablet (5 mg total) by mouth 3 (three) times a day as needed (bladder spasm)   phenazopyridine (PYRIDIUM) 200 mg tablet   No No   Sig: Take 1 tablet (200 mg total) by mouth 3 (three) times a day as needed for bladder spasms      Facility-Administered Medications: None       No past medical history on file      Past Surgical History:   Procedure Laterality Date    PENIS SURGERY         No family history on file  I have reviewed and agree with the history as documented  E-Cigarette/Vaping     E-Cigarette/Vaping Substances     Social History     Tobacco Use    Smoking status: Light Tobacco Smoker    Smokeless tobacco: Never Used    Tobacco comment: "hookah"   Substance Use Topics    Alcohol use: Yes     Comment: occasionally    Drug use: Never       Review of Systems   Constitutional: Negative for chills and fever  HENT: Negative for facial swelling, sore throat and trouble swallowing  Eyes: Negative for pain and visual disturbance  Respiratory: Negative for cough and shortness of breath  Cardiovascular: Negative for chest pain and leg swelling  Gastrointestinal: Negative for abdominal pain, diarrhea, nausea and vomiting  Genitourinary: Positive for penile pain  Negative for dysuria and flank pain  Musculoskeletal: Negative for back pain, neck pain and neck stiffness  Skin: Negative for pallor and rash  Allergic/Immunologic: Negative for environmental allergies and immunocompromised state  Neurological: Negative for dizziness, loss of consciousness and headaches  Hematological: Negative for adenopathy  Does not bruise/bleed easily  Psychiatric/Behavioral: Negative for agitation and behavioral problems  All other systems reviewed and are negative  Physical Exam  Physical Exam  Vitals and nursing note reviewed  Constitutional:       General: He is not in acute distress  Appearance: He is well-developed  HENT:      Head: Normocephalic and atraumatic  Eyes:      Extraocular Movements: Extraocular movements intact  Cardiovascular:      Rate and Rhythm: Normal rate and regular rhythm  Pulmonary:      Effort: Pulmonary effort is normal    Abdominal:      Palpations: Abdomen is soft  Tenderness: There is no abdominal tenderness  There is no guarding or rebound     Genitourinary:     Comments: Rivas catheter in place, mild dry yellowish mucoid discharge noted around the penis, orange urine in drainage back (patient on Pyridium)  Musculoskeletal:         General: Normal range of motion  Cervical back: Normal range of motion and neck supple  Skin:     General: Skin is warm and dry  Neurological:      General: No focal deficit present  Mental Status: He is alert and oriented to person, place, and time  Psychiatric:         Mood and Affect: Mood normal          Behavior: Behavior normal          Vital Signs  ED Triage Vitals   Temperature Pulse Respirations Blood Pressure SpO2   03/05/22 1010 03/05/22 1009 03/05/22 1009 03/05/22 1009 03/05/22 1009   97 7 °F (36 5 °C) 59 20 141/86 98 %      Temp Source Heart Rate Source Patient Position - Orthostatic VS BP Location FiO2 (%)   03/05/22 1010 03/05/22 1009 03/05/22 1009 03/05/22 1009 --   Oral Monitor Sitting Right arm       Pain Score       03/05/22 1009       10 - Worst Possible Pain           Vitals:    03/05/22 1009   BP: 141/86   Pulse: 59   Patient Position - Orthostatic VS: Sitting         Visual Acuity      ED Medications  Medications - No data to display    Diagnostic Studies  Results Reviewed     None                 No orders to display              Procedures  Procedures         ED Course  ED Course as of 03/05/22 1122   Sat Mar 05, 2022   1111 Case discussed with Nicolle Schwartz, Urology on call, advised since patient is stable, urine is draining in bag, already on Abx and appropriate meds, to prescribe some oxy for persistent, have patient follow up with Urology office on Monday  SBIRT 22yo+      Most Recent Value   SBIRT (22 yo +)    In order to provide better care to our patients, we are screening all of our patients for alcohol and drug use  Would it be okay to ask you these screening questions?  No Filed at: 03/05/2022 1058                    MDM  Number of Diagnoses or Management Options  Penile pain  Diagnosis management comments: Patient is a 60-year-old male, history of urethral stricture, meatotomy and circumcision at [facility], came to ER on February 28 for retention, required urologist for Rivas catheter insertion, was put on Keflex, later on started on oxybutynin and Pyridium; comes with persistent panel pain, discomfort, mucoid discharge around penis, denies fever, chills, Abdominal or flank pain  On exam, patient is conscious, alert, vital signs stable, abdomen soft nontender, no CVA tenderness, denies tenderness is noted, mild dry with for discharge around the as noted, urine draining in bag orange colored, since patient is on Pyridium  Case discussed with Urology on-call, advised to prescribed pain meds, follow up with Urology on Monday  Amount and/or Complexity of Data Reviewed  Review and summarize past medical records: yes (Recent ER visit and Urology notes)  Discuss the patient with other providers: yes        Disposition  Final diagnoses:   Penile pain     Time reflects when diagnosis was documented in both MDM as applicable and the Disposition within this note     Time User Action Codes Description Comment    3/5/2022 11:13 AM Katie Hunter Add [N48 89] Penile pain       ED Disposition     ED Disposition Condition Date/Time Comment    Discharge Stable Sat Mar 5, 2022 11:13 AM Maritza Avitia discharge to home/self care              Follow-up Information     Follow up With Specialties Details Why Kathryn Sweet MD Urology Call  Call on Monday [address] [place] [address] Rd  [phone]            Patient's Medications   Discharge Prescriptions    LIDOCAINE (XYLOCAINE) 2 % TOPICAL GEL    Apply topically as needed for mild pain       Start Date: 3/5/2022  End Date: --       Order Dose: --       Quantity: 30 mL    Refills: 0    OXYCODONE (ROXICODONE) 5 IMMEDIATE RELEASE TABLET    Take 1 tablet (5 mg total) by mouth once as needed for moderate pain for up to 1 dose Max Daily Amount: 5 mg       Start Date: 3/5/2022  End Date: --       Order Dose: 5 mg       Quantity: 5 tablet    Refills: 0       No discharge procedures on file      PDMP Review     None          ED Provider  Electronically Signed by           Claudia Meza MD  03/05/22 3448

## 2022-03-07 ENCOUNTER — TELEPHONE (OUTPATIENT)
Dept: UROLOGY | Facility: CLINIC | Age: 32
End: 2022-03-07

## 2022-03-10 ENCOUNTER — PROCEDURE VISIT (OUTPATIENT)
Dept: UROLOGY | Facility: MEDICAL CENTER | Age: 32
End: 2022-03-10
Payer: COMMERCIAL

## 2022-03-10 VITALS
SYSTOLIC BLOOD PRESSURE: 132 MMHG | HEIGHT: 70 IN | DIASTOLIC BLOOD PRESSURE: 82 MMHG | BODY MASS INDEX: 34.9 KG/M2 | HEART RATE: 73 BPM | WEIGHT: 243.8 LBS

## 2022-03-10 DIAGNOSIS — R33.9 URINARY RETENTION: Primary | ICD-10-CM

## 2022-03-10 DIAGNOSIS — N99.110 POSTPROCEDURAL MALE URETHRAL MEATAL STRICTURE: ICD-10-CM

## 2022-03-10 DIAGNOSIS — N32.9 LESION OF BLADDER: ICD-10-CM

## 2022-03-10 PROCEDURE — 99214 OFFICE O/P EST MOD 30 MIN: CPT | Performed by: UROLOGY

## 2022-03-10 PROCEDURE — 52000 CYSTOURETHROSCOPY: CPT | Performed by: UROLOGY

## 2022-03-10 RX ORDER — LEVOFLOXACIN 500 MG/1
500 TABLET, FILM COATED ORAL EVERY 24 HOURS
Qty: 3 TABLET | Refills: 0 | Status: SHIPPED | OUTPATIENT
Start: 2022-03-10 | End: 2022-03-13

## 2022-03-10 NOTE — PROGRESS NOTES
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 however the patient has had no symptoms reparable to that  Maybe diverticulum with hyperplastic tissue around the opening  Consider CT IV and p o  contrast of abdomen and pelvis with probable repeat cystoscopy possible bladder biopsy under anesthesia in the future  Possible eventual cystogram    3  Postprocedural male urethral meatal stricture-currently patent no intervention  No problem-specific Assessment & Plan notes found for this encounter  Diagnoses and all orders for this visit:    Urinary retention  -     Cystoscopy    Lesion of bladder  -     CT abdomen pelvis w wo contrast; Future  -     Comprehensive metabolic panel  -     Cystoscopy    Postprocedural male urethral meatal stricture  -     levofloxacin (LEVAQUIN) 500 mg tablet; Take 1 tablet (500 mg total) by mouth every 24 hours for 3 days          Subjective:      Patient ID: Madhavi Torres is a 32 y o  male  HPI  80-year-old gentleman with a history of urethral meatotomy 2016 at [facility] developed urinary retention due to meatal stenosis which was dilated in in the emergency room and a Rivas catheter placed  The patient denies any previous urinary problems other than decreased strength of his urinary stream feelings of incomplete bladder emptying and progressive urgency  He denies gross hematuria pneumaturia or anything consistent with fecal urea  He denies chronic abdominal pain constipation or diarrhea    The patient presents for cystourethroscopy and further evaluation  The following portions of the patient's history were reviewed and updated as appropriate: allergies, current medications, past family history, past medical history, past social history, past surgical history and problem list     Review of Systems   Genitourinary: Positive for difficulty urinating and penile pain  All other systems reviewed and are negative  Objective:      /82 (BP Location: Left arm, Patient Position: Sitting, Cuff Size: Adult)   Pulse 73   Ht 5' 10" (1 778 m)   Wt 111 kg (243 lb 12 8 oz)   BMI 34 98 kg/m²          Physical Exam  Vitals reviewed  Constitutional:       General: He is not in acute distress  Appearance: Normal appearance  He is obese  He is not ill-appearing, toxic-appearing or diaphoretic  HENT:      Head: Normocephalic and atraumatic  Nose: Nose normal       Mouth/Throat:      Mouth: Mucous membranes are moist    Eyes:      Extraocular Movements: Extraocular movements intact  Pulmonary:      Effort: Pulmonary effort is normal  No respiratory distress  Abdominal:      General: Bowel sounds are normal  There is no distension  Palpations: Abdomen is soft  Tenderness: There is no abdominal tenderness  There is no guarding or rebound  Genitourinary:     Penis: Normal        Comments: Rivas catheter draining clear urine with patent urethral meatus no ramandeep meatal discharge scrotum normal without cutaneous lesions  Phallus circumcised without cutaneous lesions  Testes adnexa palpably normal   No masses  No fluid collections  Musculoskeletal:         General: Normal range of motion  Skin:     General: Skin is dry  Neurological:      Mental Status: He is alert and oriented to person, place, and time  Psychiatric:         Mood and Affect: Mood normal          Behavior: Behavior normal          Thought Content:  Thought content normal          Judgment: Judgment normal                 Cystoscopy     Date/Time 3/10/2022 12:59 PM     Performed by  Stephane Chadwick MD     Authorized by Charlie Mitchell MD      Universal Protocol:  Consent: Verbal consent obtained  Written consent obtained  Risks and benefits: risks, benefits and alternatives were discussed  Consent given by: patient  Patient understanding: patient states understanding of the procedure being performed  Patient consent: the patient's understanding of the procedure matches consent given  Procedure consent: procedure consent matches procedure scheduled  Required items: required blood products, implants, devices, and special equipment available  Patient identity confirmed: verbally with patient        Procedure Details:  Procedure type: cystoscopy    Patient tolerance: Patient tolerated the procedure well with no immediate complications    Additional Procedure Details: After removing the preexistent Rivas catheter the urethra was prepped with Betadine and 2 percent lidocaine lubricant instilled per urethra into the urinary bladder  Cystourethroscopy revealed a normal anterior urethra without stricture lesion  The prostatic urethra revealed no evidence of significant BPH or bladder outlet obstruction  The bladder neck appeared normal   Examination the urinary bladder revealed a erythematous mucosa particularly in the midline posteriorly consistent with catheter reaction  There was some amorphous debris dependently within the urinary bladder consistent with the catheter being in for number of weeks  Urinary bladder mucosa was somewhat thickened and moderately trabeculated  The ureteral orifices were normal position and configuration bilaterally with clear efflux bilaterally  There was no evidence of extrinsic mass compression of the bladder and on retroflexion the bladder neck appeared normal with some edematous changes in the mucosa from the catheter    There were no intrinsic lesions of the bladder except on the left lateral wall of the bladder there appeared to be either a opening to a urethral diverticulum or a bladder fistula  There was hyperplastic tissue around a dimple in the wall of the bladder with attempts at placing the cystoscope into the defect failing  At the end of the procedure the bladder was left full and the cystoscope removed from the patient  The patient voided adequately thereafter  Plans to perform a CT abdomen pelvis with and without IV and p o  contrast will take place with consideration towards some sort of vesico intestinal fistula

## 2022-03-11 ENCOUNTER — PROCEDURE VISIT (OUTPATIENT)
Dept: UROLOGY | Facility: MEDICAL CENTER | Age: 32
End: 2022-03-11
Payer: COMMERCIAL

## 2022-03-11 ENCOUNTER — APPOINTMENT (OUTPATIENT)
Dept: LAB | Facility: HOSPITAL | Age: 32
End: 2022-03-11
Attending: UROLOGY
Payer: COMMERCIAL

## 2022-03-11 VITALS
HEIGHT: 70 IN | WEIGHT: 243 LBS | SYSTOLIC BLOOD PRESSURE: 120 MMHG | DIASTOLIC BLOOD PRESSURE: 64 MMHG | BODY MASS INDEX: 34.79 KG/M2

## 2022-03-11 DIAGNOSIS — R33.9 URINARY RETENTION: Primary | ICD-10-CM

## 2022-03-11 LAB
ALBUMIN SERPL BCP-MCNC: 3.9 G/DL (ref 3.5–5)
ALP SERPL-CCNC: 105 U/L (ref 46–116)
ALT SERPL W P-5'-P-CCNC: 76 U/L (ref 12–78)
ANION GAP SERPL CALCULATED.3IONS-SCNC: 9 MMOL/L (ref 4–13)
AST SERPL W P-5'-P-CCNC: 56 U/L (ref 5–45)
BILIRUB SERPL-MCNC: 0.54 MG/DL (ref 0.2–1)
BUN SERPL-MCNC: 13 MG/DL (ref 5–25)
CALCIUM SERPL-MCNC: 8.9 MG/DL (ref 8.3–10.1)
CHLORIDE SERPL-SCNC: 101 MMOL/L (ref 100–108)
CO2 SERPL-SCNC: 27 MMOL/L (ref 21–32)
CREAT SERPL-MCNC: 1.14 MG/DL (ref 0.6–1.3)
GFR SERPL CREATININE-BSD FRML MDRD: 85 ML/MIN/1.73SQ M
GLUCOSE SERPL-MCNC: 117 MG/DL (ref 65–140)
POST-VOID RESIDUAL VOLUME, ML POC: 0 ML
POTASSIUM SERPL-SCNC: 3.9 MMOL/L (ref 3.5–5.3)
PROT SERPL-MCNC: 8 G/DL (ref 6.4–8.2)
SODIUM SERPL-SCNC: 137 MMOL/L (ref 136–145)

## 2022-03-11 PROCEDURE — 51798 US URINE CAPACITY MEASURE: CPT

## 2022-03-11 PROCEDURE — 36415 COLL VENOUS BLD VENIPUNCTURE: CPT | Performed by: UROLOGY

## 2022-03-11 PROCEDURE — 80053 COMPREHEN METABOLIC PANEL: CPT | Performed by: UROLOGY

## 2022-03-11 NOTE — PROGRESS NOTES
3/11/2022  Abi Boogie is a 32 y o  male  [id number]    Diagnosis:  Chief Complaint     Urinary Retention          Patient presents for follow up post void residual s/p cystoscopy 3-10-22 managed by Dr Sami Butcher:  Return to the office in 2 weeks with Dr Negro Clifton to review CT scan results and plan next steps  Assessment:   Pt presented to the office today for PVR s/p cystoscopy with Dr Negro Clifton  Pt had cee catheter removed yesterday  According to patient, he has been "doing great" since procedure yesterday  His stream is strong and he is passing his urine without difficulty  Pt was able to void while in the office  PVR completed and 0ml recorded in bladder  Encouraged patient to continue to hydrate well and to monitor his symptoms  Advised patient to contact the office with any questions or concerns he should have until his next scheduled follow up  Vitals:    03/11/22 1102   BP: 120/64   BP Location: Left arm   Patient Position: Sitting   Cuff Size: Standard   Weight: 110 kg (243 lb)   Height: 5' 10" (1 778 m)           Patient voided while in the office    Post void residual measured via bladder scanner to be 0 mL    Recent Results (from the past 6 hour(s))   POCT Measure PVR    Collection Time: 03/11/22 11:12 AM   Result Value Ref Range    POST-VOID RESIDUAL VOLUME, ML POC 0 mL         Wilber Meade RN

## 2022-03-16 ENCOUNTER — HOSPITAL ENCOUNTER (OUTPATIENT)
Dept: CT IMAGING | Facility: HOSPITAL | Age: 32
Discharge: HOME | End: 2022-03-16
Attending: UROLOGY
Payer: COMMERCIAL

## 2022-03-16 DIAGNOSIS — N32.9 LESION OF BLADDER: ICD-10-CM

## 2022-03-16 PROCEDURE — G1004 CDSM NDSC: HCPCS

## 2022-03-16 PROCEDURE — 74178 CT ABD&PLV WO CNTR FLWD CNTR: CPT

## 2022-03-16 RX ADMIN — IOHEXOL 100 ML: 350 INJECTION, SOLUTION INTRAVENOUS at 08:52

## 2022-03-24 ENCOUNTER — TELEPHONE (OUTPATIENT)
Dept: OTHER | Facility: OTHER | Age: 32
End: 2022-03-24

## 2022-03-24 NOTE — TELEPHONE ENCOUNTER
Spoke to the patient and reviewed his CT scan    He needs to have an appointment with me so that I can schedule cystoscopy bladder biopsy in the operating room with a cystogram thanks

## 2022-03-25 NOTE — TELEPHONE ENCOUNTER
Spoke to pt and scheduled him for appt with Dr Krissy Verduzco on 3/29/22 to schedule cysto/bladder biopsy with cystogram in OR

## 2022-03-29 ENCOUNTER — OFFICE VISIT (OUTPATIENT)
Dept: UROLOGY | Facility: MEDICAL CENTER | Age: 32
End: 2022-03-29
Payer: COMMERCIAL

## 2022-03-29 VITALS
DIASTOLIC BLOOD PRESSURE: 80 MMHG | HEART RATE: 60 BPM | SYSTOLIC BLOOD PRESSURE: 116 MMHG | BODY MASS INDEX: 34.93 KG/M2 | WEIGHT: 244 LBS | HEIGHT: 70 IN

## 2022-03-29 DIAGNOSIS — N32.9 LESION OF BLADDER: Primary | ICD-10-CM

## 2022-03-29 DIAGNOSIS — K76.0 HEPATIC STEATOSIS: ICD-10-CM

## 2022-03-29 DIAGNOSIS — R31.29 MICROSCOPIC HEMATURIA: ICD-10-CM

## 2022-03-29 DIAGNOSIS — R33.9 URINARY RETENTION: ICD-10-CM

## 2022-03-29 DIAGNOSIS — N99.110 POSTPROCEDURAL MALE URETHRAL MEATAL STRICTURE: ICD-10-CM

## 2022-03-29 PROCEDURE — 99214 OFFICE O/P EST MOD 30 MIN: CPT | Performed by: UROLOGY

## 2022-03-29 NOTE — H&P
H&P Exam - Urology   Flor Pearl 32 y o  male MRN: [id number]  Unit/Bed#:  Encounter: [id number]    Assessment/Plan     Assessment:  Bladder lesion  Urethral meatal stricture  History of urinary retention with PVR now 0  Plan:  Cystoscopy bladder biopsy fulguration    History of Present Illness   HPI:  Flor Pearl is a 32 y o  male who presents with a history of urethral meatotomy 2016 at [facility] who developed urinary retention due to meatal stenosis which was dilated in the emergency room and a Rivas catheter placed  The patient did undergo cystourethroscopy in March of 2022 at which time anterior urethra was found to be normal the prostate nonenlarged and the bladder neck appearing normal   Within the urinary bladder there was an erythematous mucosa particularly in the midline consistent with catheter reaction as well as some amorphous debris dependently  The bladder mucosa was somewhat thickened in moderately trabeculated without evidence of extrinsic mass  There were no intrinsic lesions of the bladder except on the lateral wall where there appeared to be an opening to a urethral diverticulum or potentially a  bladder fistula     CT of the abdomen and pelvis revealed no evidence of bladder fistula with nonspecific mild circumferential bladder wall thickening no solid renal mass or upper tract urothelial lesion and hepatic steatosis  Patient now presents for cystoscopy and further endoscopic evaluation of the area described on the left bladder wall including bladder biopsy and fulguration if there appears to be abnormal mucosa persistent as found previously on cystoscopy  A cystogram will be performed as an outpatient prior in Radiology  I spoke to the patient in detail concerning my findings and results    The patient agrees to cystoscopy bladder biopsy fulguration understanding risks of bleeding infection need for additional procedures possible false negative biopsies urinary retention or need for other intervention  A possible Rivas catheter was also discussed    Review of Systems   All other systems reviewed and are negative  Historical Information   History reviewed  No pertinent past medical history  Past Surgical History:   Procedure Laterality Date    PENIS SURGERY       Social History   Social History     Substance and Sexual Activity   Alcohol Use Yes    Comment: occasionally     Social History     Substance and Sexual Activity   Drug Use Never     Social History     Tobacco Use   Smoking Status Light Tobacco Smoker   Smokeless Tobacco Never Used   Tobacco Comment    "hookah"     Family History:   Family History   Problem Relation Age of Onset    Diabetes Father        Meds/Allergies   all medications and allergies reviewed  No Known Allergies    Objective   Vitals: There were no vitals taken for this visit  [unfilled]    Invasive Devices  Report    Peripheral Intravenous Line            Peripheral IV 02/28/22 Right Antecubital 28 days          Drain            Urethral Catheter Coude 16 Fr  28 days                Physical Exam  Vitals reviewed  Constitutional:       General: He is not in acute distress  Appearance: Normal appearance  He is obese  He is not ill-appearing, toxic-appearing or diaphoretic  HENT:      Head: Normocephalic and atraumatic  Nose: Nose normal       Mouth/Throat:      Mouth: Mucous membranes are moist    Eyes:      Extraocular Movements: Extraocular movements intact  Cardiovascular:      Rate and Rhythm: Normal rate and regular rhythm  Pulses: Normal pulses  Heart sounds: Normal heart sounds  Pulmonary:      Effort: Pulmonary effort is normal  No respiratory distress  Breath sounds: No wheezing, rhonchi or rales  Abdominal:      General: Bowel sounds are normal  There is no distension  Palpations: Abdomen is soft  Tenderness: There is no abdominal tenderness  There is no rebound     Genitourinary:     Penis: Normal  Testes: Normal    Musculoskeletal:         General: Normal range of motion  Cervical back: Neck supple  Skin:     General: Skin is dry  Neurological:      Mental Status: He is alert and oriented to person, place, and time  Psychiatric:         Mood and Affect: Mood normal          Behavior: Behavior normal          Thought Content: Thought content normal          Judgment: Judgment normal          Lab Results: I have personally reviewed pertinent reports  Imaging: I have personally reviewed pertinent films in PACS  EKG, Pathology, and Other Studies: I have personally reviewed pertinent reports  VTE Prophylaxis: Sequential compression device Neymar Braun)     Code Status: [unfilled]  Advance Directive and Living Will:      Power of :    POLST:      Counseling / Coordination of Care  Total floor / unit time spent today 30 minutes  Greater than 50% of total time was spent with the patient and / or family counseling and / or coordination of care  A description of the counseling / coordination of care: Nora Millan

## 2022-03-29 NOTE — H&P (VIEW-ONLY)
H&P Exam - Urology   Mariam Fish 32 y o  male MRN: [id number]  Unit/Bed#:  Encounter: [id number]    Assessment/Plan     Assessment:  Bladder lesion  Urethral meatal stricture  History of urinary retention with PVR now 0  Plan:  Cystoscopy bladder biopsy fulguration    History of Present Illness   HPI:  Mariam Fish is a 32 y o  male who presents with a history of [facility] [address] who developed urinary retention due to meatal stenosis which was dilated in the emergency room and a Rivas catheter placed  The patient did undergo cystourethroscopy in March of 2022 at which time anterior urethra was found to be normal the prostate nonenlarged and the bladder neck appearing normal   Within the urinary bladder there was an erythematous mucosa particularly in the midline consistent with catheter reaction as well as some amorphous debris dependently  The bladder mucosa was somewhat thickened in moderately trabeculated without evidence of extrinsic mass  There were no intrinsic lesions of the bladder except on the lateral wall where there appeared to be an opening to a urethral diverticulum or potentially a  bladder fistula     CT of the abdomen and pelvis revealed no evidence of bladder fistula with nonspecific mild circumferential bladder wall thickening no solid renal mass or upper tract urothelial lesion and hepatic steatosis  Patient now presents for cystoscopy and further endoscopic evaluation of the area described on the left bladder wall including bladder biopsy and fulguration if there appears to be abnormal mucosa persistent as found previously on cystoscopy  A cystogram will be performed as an outpatient prior in Radiology  I spoke to the patient in detail concerning my findings and results    The patient agrees to cystoscopy bladder biopsy fulguration understanding risks of bleeding infection need for additional procedures possible false negative biopsies urinary retention or need for other intervention  A possible Rivas catheter was also discussed    Review of Systems   All other systems reviewed and are negative  Historical Information   History reviewed  No pertinent past medical history  Past Surgical History:   Procedure Laterality Date    PENIS SURGERY       Social History   Social History     Substance and Sexual Activity   Alcohol Use Yes    Comment: occasionally     Social History     Substance and Sexual Activity   Drug Use Never     Social History     Tobacco Use   Smoking Status Light Tobacco Smoker   Smokeless Tobacco Never Used   Tobacco Comment    "hookah"     Family History:   Family History   Problem Relation Age of Onset    Diabetes Father        Meds/Allergies   all medications and allergies reviewed  No Known Allergies    Objective   Vitals: There were no vitals taken for this visit  [unfilled]    Invasive Devices  Report    Peripheral Intravenous Line            Peripheral IV 02/28/22 Right Antecubital 28 days          Drain            Urethral Catheter Coude 16 Fr  28 days                Physical Exam  Vitals reviewed  Constitutional:       General: He is not in acute distress  Appearance: Normal appearance  He is obese  He is not ill-appearing, toxic-appearing or diaphoretic  HENT:      Head: Normocephalic and atraumatic  Nose: Nose normal       Mouth/Throat:      Mouth: Mucous membranes are moist    Eyes:      Extraocular Movements: Extraocular movements intact  Cardiovascular:      Rate and Rhythm: Normal rate and regular rhythm  Pulses: Normal pulses  Heart sounds: Normal heart sounds  Pulmonary:      Effort: Pulmonary effort is normal  No respiratory distress  Breath sounds: No wheezing, rhonchi or rales  Abdominal:      General: Bowel sounds are normal  There is no distension  Palpations: Abdomen is soft  Tenderness: There is no abdominal tenderness  There is no rebound     Genitourinary:     Penis: Normal  Testes: Normal    Musculoskeletal:         General: Normal range of motion  Cervical back: Neck supple  Skin:     General: Skin is dry  Neurological:      Mental Status: He is alert and oriented to person, place, and time  Psychiatric:         Mood and Affect: Mood normal          Behavior: Behavior normal          Thought Content: Thought content normal          Judgment: Judgment normal          Lab Results: I have personally reviewed pertinent reports  Imaging: I have personally reviewed pertinent films in PACS  EKG, Pathology, and Other Studies: I have personally reviewed pertinent reports  VTE Prophylaxis: Sequential compression device Hollywood Paci)     Code Status: @[facility]@  Advance Directive and Living Will:      Power of :    POLST:      Counseling / Coordination of Care  Total floor / unit time spent today 30 minutes  Greater than 50% of total time was spent with the patient and / or family counseling and / or coordination of care  A description of the counseling / coordination of care: Jayleen Baron

## 2022-03-29 NOTE — PROGRESS NOTES
H&P Exam - Urology   Corazon Peters 32 y o  male MRN: [id number]  Unit/Bed#:  Encounter: [id number]    Assessment/Plan     Assessment:  Bladder lesion  Urethral meatal stricture  History of urinary retention with PVR now 0  Plan:  Cystoscopy bladder biopsy fulguration    History of Present Illness   HPI:  Corazon Peters is a 32 y o  male who presents with a [facility] [address] [facility] [address] who developed urinary retention due to meatal stenosis which was dilated in the emergency room and a Rivas catheter placed  The patient did undergo cystourethroscopy in March of 2022 at which time anterior urethra was found to be normal the prostate nonenlarged and the bladder neck appearing normal   Within the urinary bladder there was an erythematous mucosa particularly in the midline consistent with catheter reaction as well as some amorphous debris dependently  The bladder mucosa was somewhat thickened in moderately trabeculated without evidence of extrinsic mass  There were no intrinsic lesions of the bladder except on the lateral wall where there appeared to be an opening to a urethral diverticulum or potentially a  bladder fistula     CT of the abdomen and pelvis revealed no evidence of bladder fistula with nonspecific mild circumferential bladder wall thickening no solid renal mass or upper tract urothelial lesion and hepatic steatosis  Patient now presents for cystoscopy and further endoscopic evaluation of the area described on the left bladder wall including bladder biopsy and fulguration if there appears to be abnormal mucosa persistent as found previously on cystoscopy  A cystogram will be performed as an outpatient prior in Radiology  I spoke to the patient in detail concerning my findings and results    The patient agrees to cystoscopy bladder biopsy fulguration understanding risks of bleeding infection need for additional procedures possible false negative biopsies urinary retention or need for other intervention  A possible Rivas catheter was also discussed    Review of Systems   All other systems reviewed and are negative  Historical Information   History reviewed  No pertinent past medical history  Past Surgical History:   Procedure Laterality Date    PENIS SURGERY       Social History   Social History     Substance and Sexual Activity   Alcohol Use Yes    Comment: occasionally     Social History     Substance and Sexual Activity   Drug Use Never     Social History     Tobacco Use   Smoking Status Light Tobacco Smoker   Smokeless Tobacco Never Used   Tobacco Comment    "hookah"     Family History:   Family History   Problem Relation Age of Onset    Diabetes Father        Meds/Allergies   all medications and allergies reviewed  No Known Allergies    Objective   Vitals: There were no vitals taken for this visit  [unfilled]    Invasive Devices  Report    Peripheral Intravenous Line            Peripheral IV 02/28/22 Right Antecubital 28 days          Drain            Urethral Catheter Coude 16 Fr  28 days                Physical Exam  Vitals reviewed  Constitutional:       General: He is not in acute distress  Appearance: Normal appearance  He is obese  He is not ill-appearing, toxic-appearing or diaphoretic  HENT:      Head: Normocephalic and atraumatic  Nose: Nose normal       Mouth/Throat:      Mouth: Mucous membranes are moist    Eyes:      Extraocular Movements: Extraocular movements intact  Cardiovascular:      Rate and Rhythm: Normal rate and regular rhythm  Pulses: Normal pulses  Heart sounds: Normal heart sounds  Pulmonary:      Effort: Pulmonary effort is normal  No respiratory distress  Breath sounds: No wheezing, rhonchi or rales  Abdominal:      General: Bowel sounds are normal  There is no distension  Palpations: Abdomen is soft  Tenderness: There is no abdominal tenderness  There is no rebound     Genitourinary:     Penis: Normal  Testes: Normal    Musculoskeletal:         General: Normal range of motion  Cervical back: Neck supple  Skin:     General: Skin is dry  Neurological:      Mental Status: He is alert and oriented to person, place, and time  Psychiatric:         Mood and Affect: Mood normal          Behavior: Behavior normal          Thought Content: Thought content normal          Judgment: Judgment normal          Lab Results: I have personally reviewed pertinent reports  Imaging: I have personally reviewed pertinent films in PACS  EKG, Pathology, and Other Studies: I have personally reviewed pertinent reports  VTE Prophylaxis: Sequential compression device [place] [employer])     Code Status: [unfilled]  Advance Directive and Living Will:      Power of :    POLST:      Counseling / Coordination of Care  Total floor / unit time spent today 30 minutes  Greater than 50% of total time was spent with the patient and / or family counseling and / or coordination of care  A description of the counseling / coordination of care: Marleni Rodriguez

## 2022-03-30 ENCOUNTER — TELEPHONE (OUTPATIENT)
Dept: UROLOGY | Facility: MEDICAL CENTER | Age: 32
End: 2022-03-30

## 2022-03-30 NOTE — TELEPHONE ENCOUNTER
I spoke to the patient and scheduled his CYSTO, BBX  for 4/27/2022 at [facility] with Dr Latrice Antonio     -instructions given verbally and Emailed  -CBC, CMP,  Urine C&S   2 weeks prior  -patient will avoid any potentially blood thinning medications 7 days prior  -[facility] - on [place] tracker 3/30/2022

## 2022-04-06 ENCOUNTER — TELEPHONE (OUTPATIENT)
Dept: UROLOGY | Facility: MEDICAL CENTER | Age: 32
End: 2022-04-06

## 2022-04-06 ENCOUNTER — HOSPITAL ENCOUNTER (OUTPATIENT)
Dept: RADIOLOGY | Facility: HOSPITAL | Age: 32
Discharge: HOME | End: 2022-04-06
Attending: UROLOGY

## 2022-04-06 DIAGNOSIS — N32.9 LESION OF BLADDER: ICD-10-CM

## 2022-04-06 NOTE — TELEPHONE ENCOUNTER
Per Dr Maza Shelter request, pt is scheduled to have cee catheter placed here in the office and then go to [facility] for cystogram   Spoke to Royal Rocha in Radiology Dept  [phone] and she is aware of this arrangement  Pt's cee was unable to be placed due to stricture at previous appt for cystogram     Spoke to pt and he is aware of this plan

## 2022-04-12 ENCOUNTER — TELEPHONE (OUTPATIENT)
Dept: UROLOGY | Facility: MEDICAL CENTER | Age: 32
End: 2022-04-12

## 2022-04-12 NOTE — TELEPHONE ENCOUNTER
Luli Area from Radiology Dept has 2 questions for Dr Tutu Adames re pt's cystogram tomorrow  1   If they find a fistula should they leave cee in place? 2  Would you want them to do cystogram after cee removal to check stricture? Manpreet Sanchez stated her questions could be answered through [facility]  Pt is coming in tomorrow to have cee placed here in the office prior to going to [address] [place] [address]  for cystogram   Prior cystogram appt could not be performed due to radiology being unable to place cee

## 2022-04-12 NOTE — TELEPHONE ENCOUNTER
If fistula is found they should leave the Rivas catheter    Otherwise they should just do a voiding cystourethrogram and while they are pulling the catheter they can do a retrograde urethragram as it comes out-a so-called pullout study

## 2022-04-13 ENCOUNTER — TELEPHONE (OUTPATIENT)
Dept: UROLOGY | Facility: MEDICAL CENTER | Age: 32
End: 2022-04-13

## 2022-04-13 NOTE — TELEPHONE ENCOUNTER
Pt was assessed by Dr Renetta Felix today due to inability to insert cee catheter for cystogram   Pt has meatal stricture and Dr Renetta Felix stated he would speak to Dr Whit Dennis about cancelling the cystogram today  Pt is scheduled for cysto with biopsies on 4/27/22 with Dr Whit Dennis  Radiology was called with this update

## 2022-04-25 ENCOUNTER — ANESTHESIA EVENT (OUTPATIENT)
Dept: PERIOP | Facility: HOSPITAL | Age: 32
End: 2022-04-25
Payer: COMMERCIAL

## 2022-04-25 ENCOUNTER — APPOINTMENT (OUTPATIENT)
Dept: LAB | Facility: HOSPITAL | Age: 32
End: 2022-04-25
Attending: UROLOGY
Payer: COMMERCIAL

## 2022-04-25 DIAGNOSIS — N32.9 LESION OF BLADDER: ICD-10-CM

## 2022-04-25 LAB
ALBUMIN SERPL BCP-MCNC: 3.8 G/DL (ref 3.5–5)
ALP SERPL-CCNC: 108 U/L (ref 46–116)
ALT SERPL W P-5'-P-CCNC: 85 U/L (ref 12–78)
ANION GAP SERPL CALCULATED.3IONS-SCNC: 8 MMOL/L (ref 4–13)
AST SERPL W P-5'-P-CCNC: 77 U/L (ref 5–45)
BASOPHILS # BLD AUTO: 0.06 THOUSANDS/ΜL (ref 0–0.1)
BASOPHILS NFR BLD AUTO: 1 % (ref 0–1)
BILIRUB SERPL-MCNC: 0.43 MG/DL (ref 0.2–1)
BUN SERPL-MCNC: 14 MG/DL (ref 5–25)
CALCIUM SERPL-MCNC: 9.2 MG/DL (ref 8.3–10.1)
CHLORIDE SERPL-SCNC: 103 MMOL/L (ref 100–108)
CO2 SERPL-SCNC: 26 MMOL/L (ref 21–32)
CREAT SERPL-MCNC: 1.09 MG/DL (ref 0.6–1.3)
EOSINOPHIL # BLD AUTO: 0.26 THOUSAND/ΜL (ref 0–0.61)
EOSINOPHIL NFR BLD AUTO: 3 % (ref 0–6)
ERYTHROCYTE [DISTWIDTH] IN BLOOD BY AUTOMATED COUNT: 12.4 % (ref 11.6–15.1)
GFR SERPL CREATININE-BSD FRML MDRD: 89 ML/MIN/1.73SQ M
GLUCOSE SERPL-MCNC: 86 MG/DL (ref 65–140)
HCT VFR BLD AUTO: 42.9 % (ref 36.5–49.3)
HGB BLD-MCNC: 14.5 G/DL (ref 12–17)
IMM GRANULOCYTES # BLD AUTO: 0.05 THOUSAND/UL (ref 0–0.2)
IMM GRANULOCYTES NFR BLD AUTO: 1 % (ref 0–2)
LYMPHOCYTES # BLD AUTO: 3.25 THOUSANDS/ΜL (ref 0.6–4.47)
LYMPHOCYTES NFR BLD AUTO: 38 % (ref 14–44)
MCH RBC QN AUTO: 29.7 PG (ref 26.8–34.3)
MCHC RBC AUTO-ENTMCNC: 33.8 G/DL (ref 31.4–37.4)
MCV RBC AUTO: 88 FL (ref 82–98)
MONOCYTES # BLD AUTO: 0.41 THOUSAND/ΜL (ref 0.17–1.22)
MONOCYTES NFR BLD AUTO: 5 % (ref 4–12)
NEUTROPHILS # BLD AUTO: 4.59 THOUSANDS/ΜL (ref 1.85–7.62)
NEUTS SEG NFR BLD AUTO: 52 % (ref 43–75)
NRBC BLD AUTO-RTO: 0 /100 WBCS
PLATELET # BLD AUTO: 262 THOUSANDS/UL (ref 149–390)
PMV BLD AUTO: 11.1 FL (ref 8.9–12.7)
POTASSIUM SERPL-SCNC: 4.1 MMOL/L (ref 3.5–5.3)
PROT SERPL-MCNC: 7.9 G/DL (ref 6.4–8.2)
RBC # BLD AUTO: 4.89 MILLION/UL (ref 3.88–5.62)
SODIUM SERPL-SCNC: 137 MMOL/L (ref 136–145)
WBC # BLD AUTO: 8.62 THOUSAND/UL (ref 4.31–10.16)

## 2022-04-25 PROCEDURE — 36415 COLL VENOUS BLD VENIPUNCTURE: CPT

## 2022-04-25 PROCEDURE — 85025 COMPLETE CBC W/AUTO DIFF WBC: CPT

## 2022-04-25 PROCEDURE — 80053 COMPREHEN METABOLIC PANEL: CPT

## 2022-04-25 PROCEDURE — 87086 URINE CULTURE/COLONY COUNT: CPT

## 2022-04-25 NOTE — PRE-PROCEDURE INSTRUCTIONS
No outpatient medications have been marked as taking for the [facility]  Pt reports not taking any meds currently  Patient  instructed on use of chlorhexidine soap per hospital protocol    Patient instructed to stop all ASA, NSAIDS, vitamins and herbal supplements from now to surgery or per Dr Melanie Sawyer No

## 2022-04-27 ENCOUNTER — ANESTHESIA (OUTPATIENT)
Dept: PERIOP | Facility: HOSPITAL | Age: 32
End: 2022-04-27
Payer: COMMERCIAL

## 2022-04-27 ENCOUNTER — HOSPITAL ENCOUNTER (OUTPATIENT)
Facility: HOSPITAL | Age: 32
Setting detail: OUTPATIENT SURGERY
Discharge: HOME | End: 2022-04-27
Attending: UROLOGY | Admitting: UROLOGY
Payer: COMMERCIAL

## 2022-04-27 ENCOUNTER — APPOINTMENT (OUTPATIENT)
Dept: RADIOLOGY | Facility: HOSPITAL | Age: 32
End: 2022-04-27
Payer: COMMERCIAL

## 2022-04-27 VITALS
BODY MASS INDEX: 36.23 KG/M2 | TEMPERATURE: 97.5 F | SYSTOLIC BLOOD PRESSURE: 106 MMHG | RESPIRATION RATE: 18 BRPM | WEIGHT: 253.09 LBS | HEIGHT: 70 IN | HEART RATE: 62 BPM | DIASTOLIC BLOOD PRESSURE: 61 MMHG | OXYGEN SATURATION: 98 %

## 2022-04-27 DIAGNOSIS — Z87.898 HISTORY OF URINARY RETENTION: ICD-10-CM

## 2022-04-27 DIAGNOSIS — R39.198 DIFFICULTY URINATING: Primary | ICD-10-CM

## 2022-04-27 DIAGNOSIS — N32.9 LESION OF BLADDER: ICD-10-CM

## 2022-04-27 LAB — BACTERIA UR CULT: NORMAL

## 2022-04-27 PROCEDURE — 74430 CONTRAST X-RAY BLADDER: CPT

## 2022-04-27 PROCEDURE — C1769 GUIDE WIRE: HCPCS | Performed by: UROLOGY

## 2022-04-27 PROCEDURE — 52281 CYSTOSCOPY AND TREATMENT: CPT | Performed by: UROLOGY

## 2022-04-27 RX ORDER — FENTANYL CITRATE 50 UG/ML
INJECTION, SOLUTION INTRAMUSCULAR; INTRAVENOUS AS NEEDED
Status: DISCONTINUED | OUTPATIENT
Start: 2022-04-27 | End: 2022-04-27

## 2022-04-27 RX ORDER — HYDROCODONE BITARTRATE AND ACETAMINOPHEN 5; 325 MG/1; MG/1
1 TABLET ORAL EVERY 8 HOURS PRN
Qty: 5 TABLET | Refills: 0 | Status: SHIPPED | OUTPATIENT
Start: 2022-04-27

## 2022-04-27 RX ORDER — OXYBUTYNIN CHLORIDE 5 MG/1
10 TABLET, EXTENDED RELEASE ORAL ONCE
Status: COMPLETED | OUTPATIENT
Start: 2022-04-27 | End: 2022-04-27

## 2022-04-27 RX ORDER — SODIUM CHLORIDE, SODIUM LACTATE, POTASSIUM CHLORIDE, CALCIUM CHLORIDE 600; 310; 30; 20 MG/100ML; MG/100ML; MG/100ML; MG/100ML
125 INJECTION, SOLUTION INTRAVENOUS CONTINUOUS
Status: DISCONTINUED | OUTPATIENT
Start: 2022-04-27 | End: 2022-04-27 | Stop reason: HOSPADM

## 2022-04-27 RX ORDER — MAGNESIUM HYDROXIDE 1200 MG/15ML
LIQUID ORAL AS NEEDED
Status: DISCONTINUED | OUTPATIENT
Start: 2022-04-27 | End: 2022-04-27 | Stop reason: HOSPADM

## 2022-04-27 RX ORDER — LIDOCAINE HYDROCHLORIDE 10 MG/ML
INJECTION, SOLUTION EPIDURAL; INFILTRATION; INTRACAUDAL; PERINEURAL AS NEEDED
Status: DISCONTINUED | OUTPATIENT
Start: 2022-04-27 | End: 2022-04-27

## 2022-04-27 RX ORDER — MEPERIDINE HYDROCHLORIDE 25 MG/ML
12.5 INJECTION INTRAMUSCULAR; INTRAVENOUS; SUBCUTANEOUS
Status: DISCONTINUED | OUTPATIENT
Start: 2022-04-27 | End: 2022-04-27 | Stop reason: HOSPADM

## 2022-04-27 RX ORDER — CEFAZOLIN SODIUM 2 G/50ML
2000 SOLUTION INTRAVENOUS ONCE
Status: DISCONTINUED | OUTPATIENT
Start: 2022-04-27 | End: 2022-04-27

## 2022-04-27 RX ORDER — MIDAZOLAM HYDROCHLORIDE 2 MG/2ML
INJECTION, SOLUTION INTRAMUSCULAR; INTRAVENOUS AS NEEDED
Status: DISCONTINUED | OUTPATIENT
Start: 2022-04-27 | End: 2022-04-27

## 2022-04-27 RX ORDER — SUCCINYLCHOLINE/SOD CL,ISO/PF 100 MG/5ML
SYRINGE (ML) INTRAVENOUS AS NEEDED
Status: DISCONTINUED | OUTPATIENT
Start: 2022-04-27 | End: 2022-04-27

## 2022-04-27 RX ORDER — ONDANSETRON 2 MG/ML
INJECTION INTRAMUSCULAR; INTRAVENOUS AS NEEDED
Status: DISCONTINUED | OUTPATIENT
Start: 2022-04-27 | End: 2022-04-27

## 2022-04-27 RX ORDER — FENTANYL CITRATE/PF 50 MCG/ML
25 SYRINGE (ML) INJECTION
Status: DISCONTINUED | OUTPATIENT
Start: 2022-04-27 | End: 2022-04-27 | Stop reason: HOSPADM

## 2022-04-27 RX ORDER — ONDANSETRON 2 MG/ML
4 INJECTION INTRAMUSCULAR; INTRAVENOUS ONCE AS NEEDED
Status: DISCONTINUED | OUTPATIENT
Start: 2022-04-27 | End: 2022-04-27 | Stop reason: HOSPADM

## 2022-04-27 RX ORDER — CEFAZOLIN SODIUM 2 G/50ML
SOLUTION INTRAVENOUS AS NEEDED
Status: DISCONTINUED | OUTPATIENT
Start: 2022-04-27 | End: 2022-04-27

## 2022-04-27 RX ORDER — HYDROCODONE BITARTRATE AND ACETAMINOPHEN 5; 325 MG/1; MG/1
1 TABLET ORAL EVERY 6 HOURS PRN
Status: DISCONTINUED | OUTPATIENT
Start: 2022-04-27 | End: 2022-04-27 | Stop reason: HOSPADM

## 2022-04-27 RX ORDER — ROCURONIUM BROMIDE 10 MG/ML
INJECTION, SOLUTION INTRAVENOUS AS NEEDED
Status: DISCONTINUED | OUTPATIENT
Start: 2022-04-27 | End: 2022-04-27

## 2022-04-27 RX ORDER — SULFAMETHOXAZOLE AND TRIMETHOPRIM 800; 160 MG/1; MG/1
1 TABLET ORAL EVERY 12 HOURS SCHEDULED
Qty: 6 TABLET | Refills: 0 | Status: SHIPPED | OUTPATIENT
Start: 2022-04-27 | End: 2022-04-30

## 2022-04-27 RX ORDER — SULFAMETHOXAZOLE AND TRIMETHOPRIM 800; 160 MG/1; MG/1
1 TABLET ORAL ONCE
Status: COMPLETED | OUTPATIENT
Start: 2022-04-27 | End: 2022-04-27

## 2022-04-27 RX ORDER — DEXAMETHASONE SODIUM PHOSPHATE 10 MG/ML
INJECTION, SOLUTION INTRAMUSCULAR; INTRAVENOUS AS NEEDED
Status: DISCONTINUED | OUTPATIENT
Start: 2022-04-27 | End: 2022-04-27

## 2022-04-27 RX ORDER — PROPOFOL 10 MG/ML
INJECTION, EMULSION INTRAVENOUS AS NEEDED
Status: DISCONTINUED | OUTPATIENT
Start: 2022-04-27 | End: 2022-04-27

## 2022-04-27 RX ADMIN — ROCURONIUM BROMIDE 10 MG: 50 INJECTION, SOLUTION INTRAVENOUS at 08:00

## 2022-04-27 RX ADMIN — PROPOFOL 200 MG: 10 INJECTION, EMULSION INTRAVENOUS at 07:51

## 2022-04-27 RX ADMIN — FENTANYL CITRATE 25 MCG: 50 INJECTION INTRAMUSCULAR; INTRAVENOUS at 08:13

## 2022-04-27 RX ADMIN — DEXAMETHASONE SODIUM PHOSPHATE 6 MG: 10 INJECTION INTRAMUSCULAR; INTRAVENOUS at 08:01

## 2022-04-27 RX ADMIN — FENTANYL CITRATE 25 MCG: 50 INJECTION, SOLUTION INTRAMUSCULAR; INTRAVENOUS at 08:57

## 2022-04-27 RX ADMIN — FENTANYL CITRATE 75 MCG: 50 INJECTION INTRAMUSCULAR; INTRAVENOUS at 07:51

## 2022-04-27 RX ADMIN — MIDAZOLAM 2 MG: 1 INJECTION INTRAMUSCULAR; INTRAVENOUS at 07:40

## 2022-04-27 RX ADMIN — SULFAMETHOXAZOLE AND TRIMETHOPRIM 1 TABLET: 800; 160 TABLET ORAL at 10:38

## 2022-04-27 RX ADMIN — FENTANYL CITRATE 25 MCG: 50 INJECTION, SOLUTION INTRAMUSCULAR; INTRAVENOUS at 09:09

## 2022-04-27 RX ADMIN — HYDROCODONE BITARTRATE AND ACETAMINOPHEN 1 TABLET: 5; 325 TABLET ORAL at 10:17

## 2022-04-27 RX ADMIN — LIDOCAINE HYDROCHLORIDE 100 MG: 10 INJECTION, SOLUTION EPIDURAL; INFILTRATION; INTRACAUDAL; PERINEURAL at 07:51

## 2022-04-27 RX ADMIN — Medication 180 MG: at 07:52

## 2022-04-27 RX ADMIN — SUGAMMADEX 300 MG: 100 INJECTION, SOLUTION INTRAVENOUS at 08:36

## 2022-04-27 RX ADMIN — OXYBUTYNIN CHLORIDE 10 MG: 5 TABLET, EXTENDED RELEASE ORAL at 10:17

## 2022-04-27 RX ADMIN — SODIUM CHLORIDE, SODIUM LACTATE, POTASSIUM CHLORIDE, AND CALCIUM CHLORIDE 125 ML/HR: .6; .31; .03; .02 INJECTION, SOLUTION INTRAVENOUS at 06:26

## 2022-04-27 RX ADMIN — CEFAZOLIN SODIUM 2000 MG: 2 SOLUTION INTRAVENOUS at 07:48

## 2022-04-27 RX ADMIN — ONDANSETRON 4 MG: 2 INJECTION INTRAMUSCULAR; INTRAVENOUS at 08:27

## 2022-04-27 NOTE — INTERVAL H&P NOTE
H&P reviewed  After examining the patient I find no changes in the patients condition since the H&P had been written  /66   Pulse 62   Temp (!) 97 °F (36 1 °C) (Tympanic)   Resp 18   Ht 5' 10" (1 778 m)   Wt 115 kg (253 lb 1 4 oz)   SpO2 93%   BMI 36 31 kg/m²       Vitals:    04/27/22 0607   BP: 118/66   Pulse: 62   Resp: 18   Temp: (!) 97 °F (36 1 °C)   SpO2: 93%

## 2022-04-27 NOTE — INTERVAL H&P NOTE
H&P reviewed  After examining the patient I find no changes in the patients condition since the H&P had been written  I SPOKE WITH THE PATIENT IN THE HOLDING AREA AND HE INFORMED ME OF THE FACT THAT A CYSTOGRAM WAS NOT PERFORMED BECAUSE OF RECURRENT URETHRAL MEATAL STENOSIS  PATIENT THEREFORE AGREED THAT ADDITION TO CYSTOSCOPY BLADDER BIOPSY AND FULGURATION URETHRAL MEATAL DILATION AND OR URETHRAL MEATUS TAUGHT ANY MAY ALSO BE NECESSARY  THE PATIENT ALSO GAVE PERMISSION FOR A CYSTOGRAM TO BE PERFORMED IN THE OPERATING ROOM      Vitals:    04/27/22 0607   BP: 118/66   Pulse: 62   Resp: 18   Temp: (!) 97 °F (36 1 °C)   SpO2: 93%

## 2022-04-27 NOTE — ANESTHESIA POSTPROCEDURE EVALUATION
Post-Op Assessment Note    CV Status:  Stable    Pain management: adequate     Mental Status:  Alert and awake   Hydration Status:  Euvolemic   PONV Controlled:  Controlled   Airway Patency:  Patent      Post Op Vitals Reviewed: Yes      Staff: Anesthesiologist         No complications documented      BP      Temp      Pulse     Resp      SpO2      /61   Pulse 62   Temp 97 5 °F (36 4 °C)   Resp 18   Ht 5' 10" (1 778 m)   Wt 115 kg (253 lb 1 4 oz)   SpO2 98%   BMI 36 31 kg/m²

## 2022-04-27 NOTE — OP NOTE
OPERATIVE REPORT  PATIENT NAME: Radha Raymond    :  1990  MRN: [id number]  Pt Location: [facility] OR ROOM 01    SURGERY DATE: 2022    Surgeon(s) and Role:     * Tomasa Ortiz MD - Primary    Preop Diagnosis:  Lesion of bladder [N32 9]  Sub meatal urethral stricture    Post-Op Diagnosis Codes:  Sub meatal and deep bulbar urethral stricture    Procedure(s) (LRB):  CYSTOSCOPY, CYSTOGRAM, URET  DIALATION (N/A)    Specimen(s):  * No specimens in log *    Estimated Blood Loss:   Minimal    Drains:  Urethral Catheter Other (Comment) 20 Fr  (Active)   Number of days: 0       [REMOVED] Urethral Catheter Latex 16 Fr  (Removed)   Number of days: 0       Anesthesia Type:   General    Operative Indications:  Lesion of bladder [N32 9] seen on cystoscopy in office on left lateral wall of bladder after Rivas catheter was indwelling  Question of possible intravesical fistula however no evidence of such on CT  Sub meatal urethral stricture  History of urinary retention    Operative Findings:  Sub meatal urethral stricture dilated from 12 Western Danni to 25 Western Danni conclusively  Deep bulbar urethral stricture dilated as above  Normal prostatic urethra without evidence of bladder outlet obstruction or hyperplasia of the prostatic lobes  Mild trabeculation of the urinary bladder without any evidence of intrinsic mass lesion or extrinsic mass compression with normal ureteral orifices bilaterally in position and configuration with clear efflux bilaterally  Cystogram revealed no evidence of bladder wall irregularity extravasation or fistula-normal study    Complications:   None    Procedure and Technique:  I discussed the procedure in detail with the patient in the holding area  I discussed my cystoscopic findings of some hyperplastic tissue around what appeared to of possibly been a enterovesical fistula or diverticulum of the bladder    This was difficult to see in its entirety in the office and I indicated that if this persisted hyperplastic tissue around this lesion would be biopsied to rule out any malignancy and cystoscopy with cystography would be performed in order to see whether not there was a fistulous connection between bladder and bowel  The patient reaffirmed signed consent verbally  He also verbally reaffirmed consent for urethral dilation because of his history of sub meatal urethral stricture  The patient informed me that when he was supposed to come to my office for Rivas catheter insertion prior to Interventional Radiology performing a cystogram the Rivas catheter could not be inserted because of the sub meatal urethral stricture  The patient was taken to the operating room identified by the surgeon prepped and draped usual sterile fashion in the dorsal lithotomy position after general LMA anesthesia was induced and appropriate time-out was accomplished  Attempts at placing a 25 Cuban cystoscope per urethra failed because of a sub meatal urethral stricture  There was no evidence of actual urethral meatal stenosis and this was a true sub meatal stricture  The stricture was easily pierced using a 12 Western Danni male metal sound and then sequentially larger sounds up to and including a 24 Western Danni sound were able to be passed through the sub meatal region of the urethra  At this point attempts were made to place a Rivas catheter 21 Western Danni in size into the urinary bladder to perform a cystogram however I was unable to pass the Rivas catheter fully into the bladder  I removed the Rivas catheter and reinserted a cystoscope with a 30 degree telescope 22 Western Danni in size  The anterior urethra, aside from the sub meatal urethral stricture, was otherwise within normal limits except for a deep bulbar urethral stricture  With manipulation under direct vision I was able to pass the 22 Cuban cystoscope proximal to the deep bulbar urethra    The prostatic urethra was not obstructive without evidence of significant prostatic enlargement  The bladder neck appeared normal   Entry into the urinary bladder took place and the bladder was then examined using both 30 and 70 degree telescopes  No remnants of the aforementioned lesion on the lateral wall the bladder was identified there was no evidence of any fistula or diverticulum or abnormal mucosal tissue  The bladder was mildly trabeculated with normal-appearing ureteral orifices bilaterally  At this point a 0 035 Clifford-coated floppy tip guidewire was inserted through the cystoscope into the urinary bladder and the cystoscope removed under direct vision using a 30 degree telescope  The deep bulbar urethral stricture was no longer obstructive after passage of the cystoscope and the remainder the urethra was normal except for an area of dilated stricture in the sub meatal region  At this point the cystoscope was out of the patient and the wire was left in place allowing for placement of a 20 Western Danni [place] tip ureteral catheter over the wire  Wire was then removed  The bladder was completely emptied of irrigation fluid and then 500 cc of Cysto-Conray was injected into the urinary bladder under fluoroscopic control  Images the bladder on filling as well as on draining and postdrainage were obtained  Cystogram was completely normal without evidence of fistula or bladder silhouette abnormality  After the bladder was empty the Rivas catheter was left in place the patient was transferred to the recovery room having tolerated the procedure well and in good condition  Patient will of the Rivas catheter removed in 2 days in the office and then his voiding monitored with consideration towards self dilation of the sub meatal urethral stricture and monitoring of the deep bulbar urethral stricture    I was present for the entire procedure and I was present for all critical portions of the procedure    Patient Disposition:  PACU       SIGNATURE: Torrie Goodwin MD  DATE: April 27, 2022  TIME: 8:35 AM

## 2022-04-27 NOTE — ANESTHESIA PREPROCEDURE EVALUATION
Procedure:  CYSTOSCOPY WITH BIOPSIES (N/A Bladder)    Relevant Problems   NEURO/PSYCH   (+) History of urinary retention      Other   (+) Difficulty urinating   (+) Obesity (BMI 35 0-39 9 without comorbidity)   (+) Tobacco use        Physical Exam    Airway  Comment: + beard  Mallampati score: II  TM Distance: <3 FB  Neck ROM: full     Dental   No notable dental hx     Cardiovascular  Rhythm: regular, Rate: normal,     Pulmonary  Breath sounds clear to auscultation,     Other Findings        Anesthesia Plan  ASA Score- 2     Anesthesia Type- general with ASA Monitors  Additional Monitors:   Airway Plan: LMA  Plan Factors-Exercise tolerance (METS): >4 METS  Chart reviewed  Patient is a current smoker  Patient instructed to abstain from smoking on day of procedure  Patient did not smoke on day of surgery (last tobacco use 2-3 days ago)  Obstructive sleep apnea risk education given perioperatively  Induction- intravenous  Postoperative Plan-     Informed Consent- Anesthetic plan and risks discussed with patient

## 2022-04-28 ENCOUNTER — TELEPHONE (OUTPATIENT)
Dept: UROLOGY | Facility: MEDICAL CENTER | Age: 32
End: 2022-04-28

## 2022-04-28 NOTE — TELEPHONE ENCOUNTER
Spoke to pt and made appt for cee removal tomorrow at 8:30 am s/p CYSTOSCOPY, CYSTOGRAM, URET   DIALATION on 4/27/22 with Dr Óscar Toribio

## 2022-04-29 ENCOUNTER — PROCEDURE VISIT (OUTPATIENT)
Dept: UROLOGY | Facility: MEDICAL CENTER | Age: 32
End: 2022-04-29

## 2022-04-29 VITALS
BODY MASS INDEX: 35.79 KG/M2 | DIASTOLIC BLOOD PRESSURE: 80 MMHG | WEIGHT: 250 LBS | HEIGHT: 70 IN | SYSTOLIC BLOOD PRESSURE: 120 MMHG | HEART RATE: 69 BPM

## 2022-04-29 DIAGNOSIS — N32.9 LESION OF BLADDER: Primary | ICD-10-CM

## 2022-04-29 PROCEDURE — 99024 POSTOP FOLLOW-UP VISIT: CPT

## 2022-04-29 NOTE — PROGRESS NOTES
4/29/2022    Luis Gudelia  1990  [id number]    Diagnosis  Chief Complaint     Lesion of Bladder          Patient presents for cee catheter removal/CIC teaching s/p CYSTOSCOPY, CYSTOGRAM, URET  DILATION on 4/27/22  managed by Dr Kaveh White  Return this afternoon if having trouble voiding or cannot perform CIC    Procedure Cee removal/CIC Teaching    Cee catheter removed after deflation of an intact balloon  Patient tolerated well  Encouraged patient to hydrate well and return this afternoon for post void residual   he knows he may return early if uncomfortable and unable to urinate  Patient agrees to this plan  Pt was taught proper CIC technique with 16 Fr straight catheter and all questions were answered  Pt verbalized that he understood  Pt catheterized himself successfully prior to leaving the office  Per Dr Faye Barth pt is to insert catheter 4 inches into the penis to prevent stricture  Catheter order placed  Called pt this afternoon  He stated he hadn't voided yet and was about to try to do so  Advised pt to come back to the office if he was unable to void  Did not hear back from pt      Vitals:    04/29/22 0829   BP: 120/80   Pulse: 69   Weight: 113 kg (250 lb)   Height: 5' 10" (1 778 m)           Kristina Martinez RN

## (undated) DEVICE — STERILE SURGICAL LUBRICANT,  TUBE: Brand: SURGILUBE

## (undated) DEVICE — TUBING SUCTION 5MM X 12 FT

## (undated) DEVICE — ASTOUND STANDARD SURGICAL GOWN, XL: Brand: CONVERTORS

## (undated) DEVICE — Device

## (undated) DEVICE — PACK TUR

## (undated) DEVICE — GLOVE SRG BIOGEL 8

## (undated) DEVICE — INVIEW CLEAR LEGGINGS: Brand: CONVERTORS

## (undated) DEVICE — UROCATCH BAG

## (undated) DEVICE — GUIDEWIRE STRGHT TIP 0.035 IN  SOLO PLUS